# Patient Record
Sex: FEMALE | Employment: FULL TIME | URBAN - METROPOLITAN AREA
[De-identification: names, ages, dates, MRNs, and addresses within clinical notes are randomized per-mention and may not be internally consistent; named-entity substitution may affect disease eponyms.]

---
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
## 2020-03-25 DIAGNOSIS — J45.30 MILD PERSISTENT ASTHMA WITHOUT COMPLICATION: ICD-10-CM

## 2020-03-25 RX ORDER — ALBUTEROL SULFATE 0.83 MG/ML
SOLUTION RESPIRATORY (INHALATION)
Qty: 75 ML | Refills: 0 | Status: SHIPPED | OUTPATIENT
Start: 2020-03-25 | End: 2020-11-07

## 2020-03-25 RX ORDER — ALBUTEROL SULFATE 90 UG/1
AEROSOL, METERED RESPIRATORY (INHALATION)
Qty: 18 G | Refills: 0 | Status: SHIPPED | OUTPATIENT
Start: 2020-03-25 | End: 2021-04-12

## 2020-03-25 RX ORDER — BUDESONIDE AND FORMOTEROL FUMARATE DIHYDRATE 80; 4.5 UG/1; UG/1
AEROSOL RESPIRATORY (INHALATION)
Qty: 11 G | Refills: 0 | Status: SHIPPED | OUTPATIENT
Start: 2020-03-25

## 2020-03-25 NOTE — TELEPHONE ENCOUNTER
"Requested Prescriptions   Pending Prescriptions Disp Refills     albuterol (PROAIR HFA/PROVENTIL HFA/VENTOLIN HFA) 108 (90 Base) MCG/ACT inhaler [Pharmacy Med Name: Albuterol Sulfate  (90 Base) MCG/ACT Inhalation Aerosol Solution]  Last Written Prescription Date:  02/07/2019  Last Fill Quantity: 3 inhaler,  # refills: 3   Last office visit: 11/20/2019 with prescribing provider:  Dr. Zuniga   Future Office Visit:     18 g 0     Sig: INHALE 2 PUFFS BY MOUTH EVERY 6 HOURS AS NEEDED FOR SHORTNESS OF BREATH /DYSPNEA  OR  WHEEZING       Asthma Maintenance Inhalers - Anticholinergics Failed - 3/25/2020  9:36 AM        Failed - Asthma control assessment score within normal limits in last 6 months     Please review ACT score.   ACT Total Scores 2/7/2019 7/3/2019 9/25/2019   ACT TOTAL SCORE (Goal Greater than or Equal to 20) 20 19 21   In the past 12 months, how many times did you visit the emergency room for your asthma without being admitted to the hospital? 1 1 0   In the past 12 months, how many times were you hospitalized overnight because of your asthma? 0 1 0             Passed - Patient is age 12 years or older        Passed - Medication is active on med list        Passed - Recent (6 mo) or future (30 days) visit within the authorizing provider's specialty     Patient had office visit in the last 6 months or has a visit in the next 30 days with authorizing provider or within the authorizing provider's specialty.  See \"Patient Info\" tab in inbasket, or \"Choose Columns\" in Meds & Orders section of the refill encounter.           Short-Acting Beta Agonist Inhalers Protocol  Failed - 3/25/2020  9:36 AM        Failed - Asthma control assessment score within normal limits in last 6 months     Please review ACT score.           Passed - Patient is age 12 or older        Passed - Medication is active on med list        Passed - Recent (6 mo) or future (30 days) visit within the authorizing provider's specialty     " "Patient had office visit in the last 6 months or has a visit in the next 30 days with authorizing provider or within the authorizing provider's specialty.  See \"Patient Info\" tab in inbasket, or \"Choose Columns\" in Meds & Orders section of the refill encounter.               albuterol (PROVENTIL) (2.5 MG/3ML) 0.083% neb solution [Pharmacy Med Name: Albuterol Sulfate (2.5 MG/3ML) 0.083% Inhalation Nebulization Solution]  Last Written Prescription Date:  02/07/2019  Last Fill Quantity: 360ml,  # refills: 3   Last office visit: 11/20/2019 with prescribing provider:  Dr. Zuniga   Future Office Visit:     75 mL 0     Sig: USE 1 VIAL IN NEBULIZER EVERY 4 HOURS AS NEEDED FOR SHORTNESS OF BREATH /DYSPNEA  OR  WHEEZING       Asthma Maintenance Inhalers - Anticholinergics Failed - 3/25/2020  9:36 AM        Failed - Asthma control assessment score within normal limits in last 6 months     Please review ACT score.           Passed - Patient is age 12 years or older        Passed - Medication is active on med list        Passed - Recent (6 mo) or future (30 days) visit within the authorizing provider's specialty     Patient had office visit in the last 6 months or has a visit in the next 30 days with authorizing provider or within the authorizing provider's specialty.  See \"Patient Info\" tab in inbasket, or \"Choose Columns\" in Meds & Orders section of the refill encounter.           Short-Acting Beta Agonist Inhalers Protocol  Failed - 3/25/2020  9:36 AM        Failed - Asthma control assessment score within normal limits in last 6 months     Please review ACT score.   ACT Total Scores 2/7/2019 7/3/2019 9/25/2019   ACT TOTAL SCORE (Goal Greater than or Equal to 20) 20 19 21   In the past 12 months, how many times did you visit the emergency room for your asthma without being admitted to the hospital? 1 1 0   In the past 12 months, how many times were you hospitalized overnight because of your asthma? 0 1 0             Passed - " "Patient is age 12 or older        Passed - Medication is active on med list        Passed - Recent (6 mo) or future (30 days) visit within the authorizing provider's specialty     Patient had office visit in the last 6 months or has a visit in the next 30 days with authorizing provider or within the authorizing provider's specialty.  See \"Patient Info\" tab in inbasket, or \"Choose Columns\" in Meds & Orders section of the refill encounter.               budesonide-formoterol (SYMBICORT) 80-4.5 MCG/ACT Inhaler [Pharmacy Med Name: BUDESON/FORMOT 80-4.5 MCG AER]  Last Written Prescription Date:  02/07/2019  Last Fill Quantity: 1 inhaler,  # refills: 3   Last office visit: 11/20/2019 with prescribing provider:  Dr. Zuniga   Future Office Visit:     11 g 0     Sig: Inhale 2 puffs by mouth twice daily       Inhaled Steroids Protocol Failed - 3/25/2020  9:36 AM        Failed - Asthma control assessment score within normal limits in last 6 months     Please review ACT score.   ACT Total Scores 2/7/2019 7/3/2019 9/25/2019   ACT TOTAL SCORE (Goal Greater than or Equal to 20) 20 19 21   In the past 12 months, how many times did you visit the emergency room for your asthma without being admitted to the hospital? 1 1 0   In the past 12 months, how many times were you hospitalized overnight because of your asthma? 0 1 0             Passed - Patient is age 12 or older        Passed - Medication is active on med list        Passed - Recent (6 mo) or future (30 days) visit within the authorizing provider's specialty     Patient had office visit in the last 6 months or has a visit in the next 30 days with authorizing provider or within the authorizing provider's specialty.  See \"Patient Info\" tab in inbasket, or \"Choose Columns\" in Meds & Orders section of the refill encounter.           Long-Acting Beta Agonist Inhalers Protocol  Failed - 3/25/2020  9:36 AM        Failed - Asthma control assessment score within normal limits in last 6 " "months     Please review ACT score.           Failed - Order for Serevent, Striverdi, or Foradil and pt has steroid inhaler        Passed - Patient is age 12 or older        Passed - Medication is active on med list        Passed - Recent (6 mo) or future (30 days) visit within the authorizing provider's specialty     Patient had office visit in the last 6 months or has a visit in the next 30 days with authorizing provider or within the authorizing provider's specialty.  See \"Patient Info\" tab in inbasket, or \"Choose Columns\" in Meds & Orders section of the refill encounter.                 "

## 2020-06-16 ENCOUNTER — NURSE TRIAGE (OUTPATIENT)
Dept: NURSING | Facility: CLINIC | Age: 20
End: 2020-06-16

## 2020-06-16 NOTE — TELEPHONE ENCOUNTER
"Mother is calling with questions regarding testing for her special needs daughter. Patient only has some complaints of being tired and wanting to rest a lot. She has a history of headaches and dizziness. She also has other chronic conditions. Mother doesn't think that the patient was exposed to covid-19 but was enquiring about testing just to be sure.   Mother was told that [facility] is currently testing symptomatic patients and patients coming in for procedures, etc. Mother was given information about looking for testing sites on the [place] Department of Health web site for sites that are doing testing without a doctor's order. What possible symptoms to look for was also discussed. Mother will look at the web site and call back if the situation changes.      Additional Information    Health Information question, no triage required and triager able to answer question    Answer Assessment - Initial Assessment Questions  1. REASON FOR CALL or QUESTION: \"What is your reason for calling today?\" or \"How can I best help you?\" or \"What question do you have that I can help answer?\"      Can my daughter have covid testing if she is not having symptoms?    Protocols used: INFORMATION ONLY CALL-CASTRO-AUREA Sanchez RN on 6/16/2020 at 12:12 PM    "

## 2020-08-07 ENCOUNTER — NURSE TRIAGE (OUTPATIENT)
Dept: NURSING | Facility: CLINIC | Age: 20
End: 2020-08-07

## 2020-08-07 NOTE — TELEPHONE ENCOUNTER
Caller was the mom (patient was around and gave a telephone consent to talk to mom)  Caller state that patient was bitten by an insect  (not sure the type of insect/bug) yesterday  Patient took the phone from mom, states that the site is painful and swollen.  States the redness is spreading-denied fever or difficulty breathing.States that the rash is becoming purplish in color  Patient states that she has asthma-might not be easy for him to detect respiratory issue.  Advised to be seen in person within 24 hours in person-was declined.  Waleska Cerna RN.  COVID 19 Nurse Triage Plan/Patient Instructions    Please be aware that novel coronavirus (COVID-19) may be circulating in the community. If you develop symptoms such as fever, cough, or SOB or if you have concerns about the presence of another infection including coronavirus (COVID-19), please contact your health care provider or visit [email or web address].     Disposition/Instructions    In-Person Visit with provider recommended. Reference Visit Selection Guide.    Thank you for taking steps to prevent the spread of this virus.  o Limit your contact with others.  o Wear a simple mask to cover your cough.  o Wash your hands well and often.    Resources    [facility]: About COVID-19: [email or web address]    CDC: What to Do If You're Sick: www.cdc.gov/coronavirus/2019-ncov/about/steps-when-sick.html    CDC: Ending Home Isolation: www.cdc.gov/coronavirus/2019-ncov/hcp/disposition-in-home-patients.html     CDC: Caring for Someone: www.cdc.gov/coronavirus/2019-ncov/if-you-are-sick/care-for-someone.html     [facility]: Interim Guidance for Hospital Discharge to Home: www.health.[facility].[place].us/diseases/coronavirus/hcp/hospdischarge.pdf    [facility] clinical trials (COVID-19 research studies): [email or web address].[facility]/umn-clinical-trials     Below are the COVID-19 hotlines at the [place] Department of Health ([facility]). Interpreters are available.   o For  health questions: Call 877-157-6845 or 1-876.900.8551 (7 a.m. to 7 p.m.)  o For questions about schools and childcare: Call 052-373-3977 or 1-319.427.7976 (7 a.m. to 7 p.m.)                       Additional Information    Negative: Unresponsive, passed out or very weak    Negative: Difficulty breathing or wheezing    Negative: [1] Hoarseness or cough AND [2] sudden onset following bite    Negative: [1] Difficulty swallowing, drooling or slurred speech AND [2] sudden onset following bite    Negative: Confused thinking or talking    Negative: [1] Life-threatening reaction (anaphylaxis) in the past to same insect bite AND [2] < 2 hours since bite    Negative: Sounds like a life-threatening emergency to the triager    Negative: Mosquito bite    Negative: Bee sting    Negative: Bed bug bite(s)    Negative: Fire ant sting    Negative: Spider bite    Negative: Tick bite    Negative: Doesn't sound like an insect bite    Negative: [1] Caterpillar exposure AND [2] eye symptoms    Negative: [1] Fever AND [2] spreading red area or streak    Negative: Child sounds very sick or weak to the triager    Negative: [1] Caterpillar sting AND [2] systemic symptoms (widespread hives, vomiting, muscle pain, etc)  AND [3] no 911 symptoms    [1] Painful spreading redness AND [2] started over 24 hours after the bite AND [3] no fever    Protocols used: INSECT BITE-P-

## 2020-08-29 NOTE — PROGRESS NOTES
"CC:  Cyndi Grady is a 18 year old female here for follow-up of multiple concerns as below, most notably POTS, chronic daily headaches, asthma, and documentation required for community college in the fall.    HPI:  We discussed the following concerns:    1. POTS.  This is Cyndi's most bothersome medical concern.  She continues to have fatigue, dizziness, and lightheadedness that keep her in bed and from attending some activities.  She has been able to work as a  this summer and POTS has not interfered too much with her job.  However, in the spring, she was unable to receive her diploma because of the number of days she missed in school; she did receive her GED equivalent and will be starting community college in the fall at [employer].  Her mother Imani states that joint pain sometimes prevents Cyndi from getting out of bed; Cyndi described this more optimistically and said she feels that her pain is under good control.  She is going to physical therapy once a week with good results, and Cyndi reports that this has helped her feel she is able to stand much longer at work.  She is not using a wheelchair or any assistive device for mobility.  She is drinking approximately 4 glasses of water per day.  She has three meals a day and occasional snacks.    2.  Mood. Cyndi's mood has been \"great.\"  She attributes this to a fun summer and lots of sunlight.  She is followed by psychiatry and is on fluoxetine 30mg daily.  She had thoughts of self-harm a couple years ago that she never acted upon, and has not had any of these feelings within the past year.  She was going to an art therapist on a regular basis (once a week) for a period of time, but last saw her therapist three months ago.  She is open to going back to therapy before community college starts.    3.  Chronic daily headache, GI upset, nausea. These have been persistent and occurring about every other day, especially if sleep is poor (see below).  " Patient mother updated with patient labs, vs, pain level, and that we got her up to chair and that Cherri Habermann saw her "However, they are not interfering with activities as much as they have been in the past.  Nausea seems to have improved as it used to occur daily.  yCndi notes that in the past she had used an elimination diet with some improvement in her headaches, GI upset, and nausea.  She tried it \"full court press\" for two weeks in 2017 with substantial improvement, especially in GI upset, but \"life got in the way\" and she was unable to maintain the elimination diet.  She tried again in early 2018 and was able to maintain it about five days.  Today, she expressed interest in trying it again in anticipation of starting community college in the fall.    4. Sleep. Sleep is \"okay.\"  She goes to bed at 11pm-12am and falls asleep within an hour.  She reports this is better than past visits, when it used to take her two hours to fall asleep.  She does have occasional nighttime awakenings and has trouble falling back asleep.  She has never tried melatonin or other medication for sleep.    5.  Heavy menstrual periods. We briefly discussed Cyndi's heavy periods, for which she takes Zahira and receives depo (estradiol only) from her gynecologist.  She reports missing about five pills per months, which leads to some irregular spotting; however otherwise her symptoms are manageable and she is content with her current regimen.    6. Acne. On Retin-A 0.025% cream which was prescribed at last visit; she is happy with these results.    7.  Asthma.  ACT of 20 today indicating adequate control.  Using albuterol nebulizer approximately once per month.  No nighttime awakenings.  Cyndi reports good adherence to her daily controler.  Last ACT on 3/20/18 was 10; this was shortly after a hospitalization for influenza and asthma exacerbation.        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     " Environmental allergies     Peanut allergy     Chronic daily headache     Chronic nausea     Egg allergy     Herpes simplex vulvovaginitis     Acne vulgaris     Current Outpatient Prescriptions   Medication     albuterol (PROAIR HFA/PROVENTIL HFA/VENTOLIN HFA) 108 (90 BASE) MCG/ACT Inhaler     budesonide-formoterol (SYMBICORT) 80-4.5 MCG/ACT Inhaler     drospirenone-ethinyl estradiol (JAYCOB) 3-0.02 MG per tablet     estradiol cypionate (DEPO-ESTRADIOL) 5 MG/ML injection     FLUoxetine HCl (PROZAC PO)     loratadine-pseudoePHEDrine (CLARITIN-D 24-HOUR)  MG per 24 hr tablet     tretinoin (RETIN-A) 0.025 % cream     albuterol (2.5 MG/3ML) 0.083% neb solution     EPINEPHrine (ADRENACLICK) 0.3 MG/0.3ML injection 2-pack     EPINEPHrine (EPIPEN/ADRENACLICK/OR ANY BX GENERIC EQUIV) 0.3 MG/0.3ML injection 2-pack     mometasone-formoterol (DULERA) 100-5 MCG/ACT oral inhaler     triamcinolone (KENALOG) 0.1 % ointment     valACYclovir (VALTREX) 500 MG tablet     No current facility-administered medications for this visit.         Allergies   Allergen Reactions     Cats      Dogs      Egg [Chicken-Derived Products (Egg)]      Milk Protein Extract      Abdominal pain.     Nuts [Peanut-Derived] Hives and Swelling     Tree nuts-Cashew, pecans,walnuts.     Peanuts [Nuts] Hives and Swelling     Sesame Oil Hives and Swelling     Sesame seeds-not oil.     Most Recent Immunizations   Administered Date(s) Administered     DTAP (<7y) 04/13/2004     HEPA 05/04/2009     HPV 06/21/2016     HPV9 07/25/2017     HepB 01/24/2001     Influenza (IIV3) PF 10/23/2012     Influenza Vaccine IM 3yrs+ 4 Valent IIV4 12/16/2016     MMR 04/13/2004     Meningococcal (Bexsero ) 03/20/2018     Meningococcal (Menactra ) 03/20/2018     Pedvax-hib 06/11/2001     Pneumococcal (PCV 7) 06/11/2001     Poliovirus, inactivated (IPV) 04/13/2004     TD (ADULT, 7+) 09/19/2003     Tdap (Adacel,Boostrix) 08/22/2011     Varicella 08/11/2008     Review Of Systems:  "complete review of systems was performed and is negative except as above in HPI for headaches, joint pain, nausea, dizziness, fatigue.    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  She will be starting [employer] in the fall.      P.E.:  /81  Pulse 74  Temp 97.1  F (36.2  C) (Oral)  Ht 5' 9.41\" (1.763 m)  Wt 135 lb 6 oz (61.4 kg)  LMP 06/25/2018 (Approximate)  BMI 19.76 kg/m2  Gen: alert, in NAD  HEENT: Normocephalic, atraumatic, moist mucus membranes  Neck: supple. no nodes, no thyromegaly  Cor: RRR, normal S1, S2, no murmurs, rubs or gallops  Resp: CTA bilaterlly, no wheezes, rales or ronchi  Abdom: good BS, soft NT, ND, no organomegaly  Extrems: no clubbing, cyanosis or edema  Skin: mild comedonal acne to forehead, otherwise no lesions, skin well-perfused    Assessment: Cyndi has had significant improvement in mood and symptoms since her last visit; however she still has persistent symptoms from POTS, chronic daily headaches, GI upset, nausea, and environmental and food allergies.  She is willing to get back into art therapy, which we support.  She was interested in the idea of another trial of the elimination diet today, and we suggested a return visit to discuss this in more detail if she decides she would like to do this.    Plan:  1.  Consider trial of elimination diet for chronic daily headaches, GI upset, POTS -family will schedule clinic visit to discuss if they want to move forward with this.  2.  Recommended increasing water intake to minimum 8 glasses of water spread throughout the day.  Continue three daily meals and frequent snacks.  3.  Letter for accommodations provided for [employer] to allow access to water, snacks, extended time for tests, and private room for tests.  4.  Immunizations up to date.  5.  Continue other medications (fluoxetine, Zahira) and routine follow-up with therapist, gyn, and pxychiatry.    Sahra" MD Sandor, Adolescent Medicine Fellow    Attending:  Patient seen, examined and discussed with fellow.  Agree with above assessment and plan.  Spent over 50% of the visit in face-to face counseling regarding issues documented above in note by fellow.  Total visit time: 40 minutes.

## 2020-09-03 ENCOUNTER — TELEPHONE (OUTPATIENT)
Dept: PEDIATRICS | Facility: CLINIC | Age: 20
End: 2020-09-03

## 2020-09-03 NOTE — TELEPHONE ENCOUNTER
I called mother and let her know that given Cyndi's age, it would ideal for her to establish care with an adult provider now.  I recommended the [facility] on [facility].

## 2020-09-03 NOTE — TELEPHONE ENCOUNTER
Reason for Call:  Other     Detailed comments: Mother would like to speak with provider regarding patients current health. Would not elaborate.    Phone Number   COLUMBA BREWER (Mother) [phone] (H)         Best Time:     Can we leave a detailed message on this number? YES    Call taken on 9/3/2020 at 10:12 AM by Kelsy García

## 2020-09-22 ENCOUNTER — OFFICE VISIT (OUTPATIENT)
Dept: PEDIATRICS | Facility: CLINIC | Age: 20
End: 2020-09-22
Payer: COMMERCIAL

## 2020-09-22 VITALS
HEIGHT: 70 IN | WEIGHT: 136.8 LBS | BODY MASS INDEX: 19.58 KG/M2 | HEART RATE: 63 BPM | TEMPERATURE: 98.3 F | DIASTOLIC BLOOD PRESSURE: 76 MMHG | SYSTOLIC BLOOD PRESSURE: 113 MMHG

## 2020-09-22 DIAGNOSIS — M79.7 FIBROMYALGIA: ICD-10-CM

## 2020-09-22 DIAGNOSIS — Z23 ENCOUNTER FOR IMMUNIZATION: ICD-10-CM

## 2020-09-22 DIAGNOSIS — R13.10 DYSPHAGIA, UNSPECIFIED TYPE: Primary | ICD-10-CM

## 2020-09-22 PROCEDURE — 90620 MENB-4C VACCINE IM: CPT | Performed by: PEDIATRICS

## 2020-09-22 PROCEDURE — 99215 OFFICE O/P EST HI 40 MIN: CPT | Mod: 25 | Performed by: PEDIATRICS

## 2020-09-22 PROCEDURE — 90471 IMMUNIZATION ADMIN: CPT | Performed by: PEDIATRICS

## 2020-09-22 PROCEDURE — 90472 IMMUNIZATION ADMIN EACH ADD: CPT | Performed by: PEDIATRICS

## 2020-09-22 PROCEDURE — 90686 IIV4 VACC NO PRSV 0.5 ML IM: CPT | Performed by: PEDIATRICS

## 2020-09-22 ASSESSMENT — MIFFLIN-ST. JEOR: SCORE: 1463.9

## 2020-09-22 ASSESSMENT — PATIENT HEALTH QUESTIONNAIRE - PHQ9: SUM OF ALL RESPONSES TO PHQ QUESTIONS 1-9: 7

## 2020-09-22 NOTE — PROGRESS NOTES
"Subjective    Cyndi Anders Grady is a 20 year old female who presents to clinic today with mother because of:  RECHECK; Health Maintenance (HPV/ACT/PHQ/Chlamydia); and Flu Shot     HPI   Cyndi is a 20 year old female with past medical history including postural orthostatic tachycardia syndrome, chronic headache, chronic nausea, moderate asthma, environmental allergy, positive IgE testing to foods without anaphylaxis history.    Concerns: Will like handicap renewal, and vaccinations     Requests handicap parking paperwork due to her postural orthostatic tachycardia syndrome making it difficult for her to walk long distances.  She notes that she is had orthostasis roughly 3 times per week but not had non-orthostatic vasovagal presyncope.  She also notes that she has been having migrating soreness and tingling over her legs.  It does not seem to be related to physical activity or trauma.  She has not tried medications to help with pain.  She does note that it has gotten worse over the course of the past several months, and notes she thinks it could be related to the fact that she has not been doing physical therapy since the beginning of coronavirus quarantine.    At her last visit, Dr. Zuniga recommended omission of eggs and dairy products from her diet.  She has continued to consume dairy and eggs since her last appointment.  She would like to cut down on these foods she says.     She notes that she is also been having quite a bit of nausea and early satiety as well as bloating and abdominal distention.  She does not have gas.  She has not had any emesis and she has 2-3 soft stools per day.  She endorses some difficulty swallowing over the last couple of months.  She describes it as \"hard to push the food down\" and \"hard to close my airway\".  She says she feels that the \"food is going down to the right\" instead of straight down to her stomach.  She has had no regurgitation or emesis.  She does endorse some reflux " "symptoms.  She is also been having some chest tightness about 3 times a day that sometimes seems to be associated with eating.  She does have history of eczema, which seems to be well-controlled at this time, as well as positive IgE to several foods without anaphylaxis.     She does feel comfortable with her body image, however recently lost some weight due to missing meals.  She attributes this to her \"messed up\" sleep schedule where she was going to bed around 3 or 4 AM and waking up around 1 or 2 PM.  She has gained some weight back to her baseline.    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 10/06/2016     Priority: Medium     Environmental allergies 10/06/2016     Priority: Medium     Reactive depression 06/27/2016     Priority: Medium     Moderate asthma 06/21/2016     Priority: Medium      Medications  albuterol (PROAIR HFA/PROVENTIL HFA/VENTOLIN HFA) 108 (90 Base) MCG/ACT inhaler, INHALE 2 PUFFS BY MOUTH EVERY 6 HOURS AS NEEDED FOR SHORTNESS OF BREATH /DYSPNEA  OR  WHEEZING  albuterol (PROVENTIL) (2.5 MG/3ML) 0.083% neb solution, USE 1 VIAL IN NEBULIZER EVERY 4 HOURS AS NEEDED FOR SHORTNESS OF BREATH /DYSPNEA  OR  WHEEZING  budesonide-formoterol (SYMBICORT) 80-4.5 MCG/ACT Inhaler, Inhale 2 puffs by mouth twice daily  loratadine-pseudoePHEDrine (CLARITIN-D 24-HOUR)  MG 24 hr tablet, Take 1 tablet by mouth daily Reported on 3/16/2017  mometasone (NASONEX) 50 MCG/ACT nasal " "spray, Spray 2 sprays into both nostrils daily  triamcinolone (KENALOG) 0.1 % external ointment, Apply sparingly to affected area three times daily for 14 days.  Not to be used on the face.  EPINEPHrine (ADRENACLICK) 0.3 MG/0.3ML injection 2-pack, Inject 0.3 mLs (0.3 mg) into the muscle as needed for anaphylaxis  EPINEPHrine (EPIPEN/ADRENACLICK/OR ANY BX GENERIC EQUIV) 0.3 MG/0.3ML injection 2-pack, Inject 0.3 mLs (0.3 mg) into the muscle as needed for anaphylaxis  tretinoin (RETIN-A) 0.025 % cream, Spread a pea size amount into affected area topically at bedtime.  Use sunscreen SPF>20. (Patient not taking: Reported on 9/22/2020)    No current facility-administered medications on file prior to visit.     Allergies  Allergies   Allergen Reactions     Cats      Dogs      Egg [Chicken-Derived Products (Egg)]      Milk Protein Extract      Abdominal pain.     Nuts [Peanut-Derived] Hives and Swelling     Tree nuts-Cashew, pecans,walnuts.     Peanuts [Nuts] Hives and Swelling     Sesame Oil Hives and Swelling     Sesame seeds-not oil.     Reviewed and updated as needed this visit by Provider           Objective    /76   Pulse 63   Temp 98.3  F (36.8  C) (Oral)   Ht 5' 9.57\" (1.767 m)   Wt 136 lb 12.8 oz (62.1 kg)   BMI 19.87 kg/m    Facility age limit for growth percentiles is 20 years.    Physical Exam  GENERAL: Active, alert, in no acute distress.  SKIN: Clear. No significant rash, abnormal pigmentation or lesions  HEAD: Normocephalic.  EYES:  No discharge or erythema. Normal pupils and EOM.  MOUTH/THROAT: Clear. No oral lesions. Teeth intact without obvious abnormalities.  ABDOMEN: Soft, non-tender, not distended, no masses or hepatosplenomegaly.         Assessment & Plan        Cyndi is a 20-year-old female with significant past medical history including postural orthostatic tachycardia syndrome, moderate asthma, food allergies, and history of reactive depression.  She presents today for renewal of her " handicap parking and flu shot.    Recent development of difficulty swallowing and globus is concerning.   Differential diagnosis of difficulty swallowing and/or globus is very wide, given her history of atopy, as well as history of abdominal pain with new onset of swallowing difficulty, eosinophilic esophagitis is worth considering.  She may benefit from further evaluation by adult ENT and/or gastroenterology.    Additionally, her leg pain is concerning for new onset of fibromyalgia, particularly since it is aggravated by lack of physical therapy and disruption in normal life.  She does have history of mental health concerns in addition.  She may benefit from consideration of therapy as well as continuation of physical therapy.     Dr Zuniga recommended last year that Cyndi transition to adult care rather than pediatric.  Given these new concerns and likely need for further subspecialty care, it would be more appropriate to transition to a new primary care provider prior to making these referrals.    Plan  -Recommend establishing as soon as possible with an adult primary care physician.  -Recommend restarting physical therapy as soon as possible.  -Discussed sleep hygiene and goals including no electronic screens 30-60 minutes before bed, consistency of bedtime, bedtime routine, helpfulness of exercise.  Patient set goals to move bedtime back 30 minutes each day incrementally until she is going to bed at midnight.  -Administered flu and men B vaccines today  -Renew handicap parking    Other orders  -     VACCINE ADMINISTRATION, INITIAL  -     Cancel: VACCINE ADMINISTRATION, EACH ADDITIONAL  -     HC FLU VAC PRESRV FREE QUAD SPLIT VIR > 6 MONTHS IM  -     MEN B, IM (10 - 25 YRS) - Bexsero  -     VACCINE ADMINISTRATION, EACH ADDITIONAL        Follow Up  No follow-ups on file.    Patient was seen and discussed with Jose L Esqueda MD, [address]  [facility]    Attending:  Patient seen, examined and  discussed with resident.  Agree with above assessment and plan.  Spent over 50% of the visit in face-to face counseling regarding issues documented above in note by resident.  Total visit time: 40 minutes.    Gera Zuniga MD

## 2020-09-23 ASSESSMENT — ASTHMA QUESTIONNAIRES: ACT_TOTALSCORE: 20

## 2020-10-07 ENCOUNTER — TELEPHONE (OUTPATIENT)
Dept: PEDIATRICS | Facility: CLINIC | Age: 20
End: 2020-10-07

## 2020-10-07 NOTE — TELEPHONE ENCOUNTER
I called mother to discuss details of letter.  Letter has been written and electronically signed, and is now ready to be printed and left at the  for mother to .

## 2020-10-07 NOTE — TELEPHONE ENCOUNTER
I called mother back on the phone listed for home, and got a voicemail box.  LMOV asking mother to call the clinic back and let us know when would be good times to call.

## 2020-10-07 NOTE — LETTER
[place] [address] [facility] [address]  Phone: [phone]    October 7, 2020      Re: Cyndi Wick Grday  [address]  [facility] [address]      To whom it may concern:       Cyndi is a patient of mine whom I have been caring for since 2016. [facility] [place], [place] in 2013.  In August of this year, however, I saw her in clinic for new symptoms, including fatigue and pain, as well as difficulty swallowing, prompting me to refer to adult specialists for further diagnostic work-up for these new symptoms (which she is in the process of receiving).    These new medical problems have prevented her from fully participating in her academic work this fall, and so I am requesting that her academic institution allow her accommodations, including being allowed to drop any classes she has not been able to participate fully in, so that she may be eligible to complete this coursework at a future date when her health issues have resolved.    Thank you for your consideration in this matter. Please do not hesitate to contact me with any further questions or concerns.       Sincerely,        Gera Zuniga MD

## 2020-11-07 DIAGNOSIS — J45.30 MILD PERSISTENT ASTHMA WITHOUT COMPLICATION: ICD-10-CM

## 2020-11-07 RX ORDER — ALBUTEROL SULFATE 90 UG/1
2 AEROSOL, METERED RESPIRATORY (INHALATION) EVERY 6 HOURS
Qty: 18 G | Refills: 1 | Status: SHIPPED | OUTPATIENT
Start: 2020-11-07

## 2020-11-07 NOTE — TELEPHONE ENCOUNTER
"Requested Prescriptions   Signed Prescriptions Disp Refills    albuterol (PROAIR HFA/PROVENTIL HFA/VENTOLIN HFA) 108 (90 Base) MCG/ACT inhaler 18 g 1     Sig: Inhale 2 puffs into the lungs every 6 hours       Asthma Maintenance Inhalers - Anticholinergics Passed - 11/7/2020  8:39 AM        Passed - Patient is age 12 years or older        Passed - Asthma control assessment score within normal limits in last 6 months     Please review ACT score.           Passed - Medication is active on med list        Passed - Recent (6 mo) or future (30 days) visit within the authorizing provider's specialty     Patient had office visit in the last 6 months or has a visit in the next 30 days with authorizing provider or within the authorizing provider's specialty.  See \"Patient Info\" tab in inbasket, or \"Choose Columns\" in Meds & Orders section of the refill encounter.           Short-Acting Beta Agonist Inhalers Protocol  Passed - 11/7/2020  8:39 AM        Passed - Patient is age 12 or older        Passed - Asthma control assessment score within normal limits in last 6 months     Please review ACT score.           Passed - Medication is active on med list        Passed - Recent (6 mo) or future (30 days) visit within the authorizing provider's specialty     Patient had office visit in the last 6 months or has a visit in the next 30 days with authorizing provider or within the authorizing provider's specialty.  See \"Patient Info\" tab in inbasket, or \"Choose Columns\" in Meds & Orders section of the refill encounter.               Refilled per protocol, inhaler sent due to RICH.    Dora Zavala RN    "

## 2020-11-12 ENCOUNTER — OFFICE VISIT (OUTPATIENT)
Dept: DERMATOLOGY | Facility: CLINIC | Age: 20
End: 2020-11-12
Payer: COMMERCIAL

## 2020-11-12 DIAGNOSIS — L70.0 ACNE VULGARIS: Primary | ICD-10-CM

## 2020-11-12 PROCEDURE — 99203 OFFICE O/P NEW LOW 30 MIN: CPT | Mod: GC | Performed by: DERMATOLOGY

## 2020-11-12 RX ORDER — TRETINOIN 0.5 MG/G
CREAM TOPICAL AT BEDTIME
Qty: 45 G | Refills: 2 | Status: SHIPPED | OUTPATIENT
Start: 2020-11-12 | End: 2020-11-13

## 2020-11-12 RX ORDER — CLINDAMYCIN PHOSPHATE 10 UG/ML
LOTION TOPICAL DAILY
Qty: 60 ML | Refills: 2 | Status: SHIPPED | OUTPATIENT
Start: 2020-11-12 | End: 2020-11-13

## 2020-11-12 ASSESSMENT — PAIN SCALES - GENERAL: PAINLEVEL: MILD PAIN (2)

## 2020-11-12 NOTE — NURSING NOTE
Dermatology Rooming Note    Cyndi Mcnamara Shamikaadam Grady's goals for this visit include:   Chief Complaint   Patient presents with     Derm Problem     Cnydi is here for concerns of acne on her face, arms, chest, and back.         Deedee Rios LPN

## 2020-11-12 NOTE — PATIENT INSTRUCTIONS
- start using benzoyl peroxide 5% wash daily (okay to use on face, chest, back, arms)  - start using clindamycin 1% lotion daily (okay to use on face, chest, back, arms)  - start tretinoin 0.05% cream nightly to face only

## 2020-11-12 NOTE — LETTER
"11/12/2020       RE: Cyndi Grady  [address]  [place] [address]     Dear Colleague,    Thank you for referring your patient, Cyndi Grady, to the [facility] [place] at [facility]. Please see a copy of my visit note below.    [facility] Dermatology Note      Dermatology Problem List:  # Pertinent PMH: POTS.  1. Acne vulgaris, inflammatory +/- isotretinoin.  - tretinoin 0.05% cream nightly  - clindamcyin lotion  - BPO 5% wash daily in shower  - future: consider isotretinoin, likely avoid spironolactone due to h/o POTS    Encounter Date: Nov 12, 2020    CC:   Chief Complaint   Patient presents with     Derm Problem     Cyndi is here for concerns of acne on her face, arms, chest, and back.           History of Present Illness:  Ms. Cyndi Grady is a 20 year old female who presents in self referral for acne vulgaris. Patient states that she has had acne ever since she was 12. She says that she previously saw a Dermatologist for this and was prescribed a \"wash and a cream\" that she used faithfully and it completely resolved her acne. She stopped using it because her acne was so well controlled. She can't remember exactly how long ago she stopped using it. She reports that over the last year her acne has become a big problem for her again. She denies any association with her menstrual cycle, but admits that her menses are very irregular. She uses Nexplanon for birth control and thinks her acne may have gotten slightly worse with this. She says her acne affects her entire face, but also her upper chest, upper arms, and has in the past affected her entire back. She mentions that she uses at least two different OTC facial washes, uses a niacinamide serum, and moisturizers for her face.     She states that she has postural orthostatic tachycardia syndrome and frequently feels lightheaded due to " hypotension. She is not on medications for this, but has been instructed to increase her fluid and salt intake.   She has no other health concerns or skin concerns today and says that she is feeling well.      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 She has never used smokeless tobacco. She reports current drug use. Drug: Marijuana. She reports that she does not drink alcohol.    Family History:  Family History   Problem Relation Age of Onset     Melanoma No family hx of      Skin Cancer No family hx of        Medications:  Current Outpatient Medications   Medication Sig Dispense Refill     albuterol (PROAIR HFA/PROVENTIL HFA/VENTOLIN HFA) 108 (90 Base) MCG/ACT inhaler Inhale 2 puffs into the lungs every 6 hours 18 g 1     albuterol (PROAIR HFA/PROVENTIL HFA/VENTOLIN HFA) 108 (90 Base) MCG/ACT inhaler INHALE 2 PUFFS BY MOUTH EVERY 6 HOURS AS NEEDED FOR SHORTNESS OF BREATH /DYSPNEA  OR  WHEEZING 18 g 0     budesonide-formoterol (SYMBICORT) 80-4.5 MCG/ACT Inhaler Inhale 2 puffs by mouth twice daily 11 g 0     EPINEPHrine (ADRENACLICK) 0.3 MG/0.3ML injection 2-pack Inject 0.3 mLs (0.3 mg) into the muscle as needed for anaphylaxis 0.6 mL 1     EPINEPHrine (EPIPEN/ADRENACLICK/OR ANY BX GENERIC EQUIV) 0.3 MG/0.3ML injection 2-pack Inject 0.3 mLs (0.3 mg) into the muscle as needed for anaphylaxis 0.6 mL 1     loratadine-pseudoePHEDrine (CLARITIN-D 24-HOUR)  MG 24 hr tablet Take 1  tablet by mouth daily Reported on 3/16/2017 14 tablet 11     mometasone (NASONEX) 50 MCG/ACT nasal spray Spray 2 sprays into both nostrils daily 17 g 3     triamcinolone (KENALOG) 0.1 % external ointment Apply sparingly to affected area three times daily for 14 days.  Not to be used on the face. 15 g 3     tretinoin (RETIN-A) 0.025 % cream Spread a pea size amount into affected area topically at bedtime.  Use sunscreen SPF>20. (Patient not taking: Reported on 9/22/2020) 45 g 11        Allergies   Allergen Reactions     Cats      Dogs      Egg [Chicken-Derived Products (Egg)]      Milk Protein Extract      Abdominal pain.     Nuts [Peanut-Derived] Hives and Swelling     Tree nuts-Cashew, pecans,walnuts.     Peanuts [Nuts] Hives and Swelling     Sesame Oil Hives and Swelling     Sesame seeds-not oil.         Review of Systems:  -As per HPI  -Constitutional: Otherwise feeling well today, in usual state of health.  -Skin: As above in HPI. No additional skin concerns.    Physical exam:  GEN: This is a well developed, well-nourished female in no acute distress, in a pleasant mood.    SKIN: Focused examination of the face, upper chest, back, arms, hands was performed.  -Santiago skin type: IV  -There are superifical acneiform papules with intermixed open and closed comedones on the the forehead, cheeks, chin, jawline, and fewer amount on the upper chest and bilateral shoulders  - on the back there are numerous darker brown macules   -No other lesions of concern on areas examined.       Impression/Plan:  1. Acne vulgaris, inflammatory +/- hormonal component (has nexplanon). Given hormonal component, ideally would like to start spironolactone, however, with patient's POTS and hypotensive episodes, this would not be ideal. Will focus on inflammatory nature for now and reassuringly we note she previously responded well to topical regimen in past. If no improvement, consider isotretinoin.   - start tretinoin 0.05% cream  nightly  - start clindamycin 1% lotion daily   - start BPO 5% wash daily  - future: consider isotretinoin, likely avoid spironolactone due to h/o POTS    Follow-up in 3 months, earlier for new or changing lesions.       Dr. Ham staffed the patient.    Staff Involved:  Resident(Robbi)/Staff    Michael Culp MD, PhD  Med-Derm PGY-5    Staff Physician Comments:   I saw and evaluated the patient with the resident and I agree with the assessment and plan.  I was present for the examination.    Jo Ann Ham MD    Department of Dermatology  [facility]: Phone: [phone], Fax: [phone]  11/13/2020

## 2020-11-12 NOTE — PROGRESS NOTES
"[facility] Dermatology Note      Dermatology Problem List:  # Pertinent PMH: POTS.  1. Acne vulgaris, inflammatory +/- isotretinoin.  - tretinoin 0.05% cream nightly  - clindamcyin lotion  - BPO 5% wash daily in shower  - future: consider isotretinoin, likely avoid spironolactone due to h/o POTS    Encounter Date: Nov 12, 2020    CC:   Chief Complaint   Patient presents with     Derm Problem     Cyndi is here for concerns of acne on her face, arms, chest, and back.           History of Present Illness:  Ms. Cyndi Grady is a 20 year old female who presents in self referral for acne vulgaris. Patient states that she has had acne ever since she was 12. She says that she previously saw a Dermatologist for this and was prescribed a \"wash and a cream\" that she used faithfully and it completely resolved her acne. She stopped using it because her acne was so well controlled. She can't remember exactly how long ago she stopped using it. She reports that over the last year her acne has become a big problem for her again. She denies any association with her menstrual cycle, but admits that her menses are very irregular. She uses Nexplanon for birth control and thinks her acne may have gotten slightly worse with this. She says her acne affects her entire face, but also her upper chest, upper arms, and has in the past affected her entire back. She mentions that she uses at least two different OTC facial washes, uses a niacinamide serum, and moisturizers for her face.     She states that she has postural orthostatic tachycardia syndrome and frequently feels lightheaded due to hypotension. She is not on medications for this, but has been instructed to increase her fluid and salt intake.   She has no other health concerns or skin concerns today and says that she is feeling well.      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 She has never used smokeless tobacco. She reports current drug use. Drug: Marijuana. She reports that she does not drink alcohol.    Family History:  Family History   Problem Relation Age of Onset     Melanoma No family hx of      Skin Cancer No family hx of        Medications:  Current Outpatient Medications   Medication Sig Dispense Refill     albuterol (PROAIR HFA/PROVENTIL HFA/VENTOLIN HFA) 108 (90 Base) MCG/ACT inhaler Inhale 2 puffs into the lungs every 6 hours 18 g 1     albuterol (PROAIR HFA/PROVENTIL HFA/VENTOLIN HFA) 108 (90 Base) MCG/ACT inhaler INHALE 2 PUFFS BY MOUTH EVERY 6 HOURS AS NEEDED FOR SHORTNESS OF BREATH /DYSPNEA  OR  WHEEZING 18 g 0     budesonide-formoterol (SYMBICORT) 80-4.5 MCG/ACT Inhaler Inhale 2 puffs by mouth twice daily 11 g 0     EPINEPHrine (ADRENACLICK) 0.3 MG/0.3ML injection 2-pack Inject 0.3 mLs (0.3 mg) into the muscle as needed for anaphylaxis 0.6 mL 1     EPINEPHrine (EPIPEN/ADRENACLICK/OR ANY BX GENERIC EQUIV) 0.3 MG/0.3ML injection 2-pack Inject 0.3 mLs (0.3 mg) into the muscle as needed for anaphylaxis 0.6 mL 1     loratadine-pseudoePHEDrine (CLARITIN-D 24-HOUR)  MG 24 hr tablet Take 1 tablet by mouth daily Reported on 3/16/2017 14 tablet 11     mometasone (NASONEX) 50 MCG/ACT nasal spray Spray 2 sprays into both nostrils daily 17 g 3     triamcinolone (KENALOG) 0.1 % external ointment Apply sparingly to affected area three times daily for 14 days.  Not to be used on the face. 15 g 3     tretinoin (RETIN-A)  0.025 % cream Spread a pea size amount into affected area topically at bedtime.  Use sunscreen SPF>20. (Patient not taking: Reported on 9/22/2020) 45 g 11        Allergies   Allergen Reactions     Cats      Dogs      Egg [Chicken-Derived Products (Egg)]      Milk Protein Extract      Abdominal pain.     Nuts [Peanut-Derived] Hives and Swelling     Tree nuts-Cashew, pecans,walnuts.     Peanuts [Nuts] Hives and Swelling     Sesame Oil Hives and Swelling     Sesame seeds-not oil.         Review of Systems:  -As per HPI  -Constitutional: Otherwise feeling well today, in usual state of health.  -Skin: As above in HPI. No additional skin concerns.    Physical exam:  GEN: This is a well developed, well-nourished female in no acute distress, in a pleasant mood.    SKIN: Focused examination of the face, upper chest, back, arms, hands was performed.  -Santiago skin type: IV  -There are superifical acneiform papules with intermixed open and closed comedones on the the forehead, cheeks, chin, jawline, and fewer amount on the upper chest and bilateral shoulders  - on the back there are numerous darker brown macules   -No other lesions of concern on areas examined.       Impression/Plan:  1. Acne vulgaris, inflammatory +/- hormonal component (has nexplanon). Given hormonal component, ideally would like to start spironolactone, however, with patient's POTS and hypotensive episodes, this would not be ideal. Will focus on inflammatory nature for now and reassuringly we note she previously responded well to topical regimen in past. If no improvement, consider isotretinoin.   - start tretinoin 0.05% cream nightly  - start clindamycin 1% lotion daily   - start BPO 5% wash daily  - future: consider isotretinoin, likely avoid spironolactone due to h/o POTS    Follow-up in 3 months, earlier for new or changing lesions.       Dr. Ham staffed the patient.    Staff Involved:  Resident(Robbi)/Staff    Michael Culp MD, PhD  Med-Derm  PGY-5    Staff Physician Comments:   I saw and evaluated the patient with the resident and I agree with the assessment and plan.  I was present for the examination.    Jo Ann Ham MD    Department of Dermatology  [facility] [place]: Phone: [phone], Fax: [phone]  11/13/2020

## 2020-11-13 RX ORDER — CLINDAMYCIN PHOSPHATE 10 UG/ML
LOTION TOPICAL DAILY
Qty: 60 ML | Refills: 11 | Status: SHIPPED | OUTPATIENT
Start: 2020-11-13 | End: 2023-05-16

## 2020-11-13 RX ORDER — TRETINOIN 0.5 MG/G
CREAM TOPICAL AT BEDTIME
Qty: 45 G | Refills: 2 | Status: SHIPPED | OUTPATIENT
Start: 2020-11-13 | End: 2022-01-19

## 2020-11-16 ENCOUNTER — HEALTH MAINTENANCE LETTER (OUTPATIENT)
Age: 20
End: 2020-11-16

## 2021-01-04 ENCOUNTER — OFFICE VISIT (OUTPATIENT)
Dept: FAMILY MEDICINE | Facility: CLINIC | Age: 21
End: 2021-01-04
Payer: COMMERCIAL

## 2021-01-04 VITALS
SYSTOLIC BLOOD PRESSURE: 118 MMHG | TEMPERATURE: 97.8 F | BODY MASS INDEX: 20.85 KG/M2 | HEART RATE: 64 BPM | WEIGHT: 143.5 LBS | DIASTOLIC BLOOD PRESSURE: 79 MMHG | OXYGEN SATURATION: 97 %

## 2021-01-04 DIAGNOSIS — F32.1 MODERATE MAJOR DEPRESSION (H): ICD-10-CM

## 2021-01-04 DIAGNOSIS — F41.1 GENERALIZED ANXIETY DISORDER: ICD-10-CM

## 2021-01-04 DIAGNOSIS — Z00.00 WELL ADULT EXAM: Primary | ICD-10-CM

## 2021-01-04 DIAGNOSIS — R53.83 OTHER FATIGUE: ICD-10-CM

## 2021-01-04 DIAGNOSIS — E55.9 VITAMIN D DEFICIENCY: ICD-10-CM

## 2021-01-04 LAB
DEPRECATED CALCIDIOL+CALCIFEROL SERPL-MC: 8 UG/L (ref 20–75)
FERRITIN SERPL-MCNC: 106 NG/ML (ref 12–150)
IRON SATN MFR SERPL: 41 % (ref 15–46)
IRON SERPL-MCNC: 148 UG/DL (ref 35–180)
TIBC SERPL-MCNC: 362 UG/DL (ref 240–430)
TSH SERPL DL<=0.005 MIU/L-ACNC: 1.46 MU/L (ref 0.4–4)
VIT B12 SERPL-MCNC: 280 PG/ML (ref 193–986)

## 2021-01-04 PROCEDURE — 82607 VITAMIN B-12: CPT | Performed by: PATHOLOGY

## 2021-01-04 PROCEDURE — 83550 IRON BINDING TEST: CPT | Performed by: PATHOLOGY

## 2021-01-04 PROCEDURE — 36415 COLL VENOUS BLD VENIPUNCTURE: CPT | Performed by: PATHOLOGY

## 2021-01-04 PROCEDURE — 99385 PREV VISIT NEW AGE 18-39: CPT | Performed by: NURSE PRACTITIONER

## 2021-01-04 PROCEDURE — 82306 VITAMIN D 25 HYDROXY: CPT | Mod: 90 | Performed by: PATHOLOGY

## 2021-01-04 PROCEDURE — 83540 ASSAY OF IRON: CPT | Performed by: PATHOLOGY

## 2021-01-04 PROCEDURE — 82728 ASSAY OF FERRITIN: CPT | Performed by: PATHOLOGY

## 2021-01-04 PROCEDURE — 99000 SPECIMEN HANDLING OFFICE-LAB: CPT | Performed by: PATHOLOGY

## 2021-01-04 PROCEDURE — 84443 ASSAY THYROID STIM HORMONE: CPT | Performed by: PATHOLOGY

## 2021-01-04 ASSESSMENT — ANXIETY QUESTIONNAIRES
2. NOT BEING ABLE TO STOP OR CONTROL WORRYING: SEVERAL DAYS
6. BECOMING EASILY ANNOYED OR IRRITABLE: SEVERAL DAYS
5. BEING SO RESTLESS THAT IT IS HARD TO SIT STILL: SEVERAL DAYS
GAD7 TOTAL SCORE: 8
IF YOU CHECKED OFF ANY PROBLEMS ON THIS QUESTIONNAIRE, HOW DIFFICULT HAVE THESE PROBLEMS MADE IT FOR YOU TO DO YOUR WORK, TAKE CARE OF THINGS AT HOME, OR GET ALONG WITH OTHER PEOPLE: SOMEWHAT DIFFICULT
1. FEELING NERVOUS, ANXIOUS, OR ON EDGE: SEVERAL DAYS
7. FEELING AFRAID AS IF SOMETHING AWFUL MIGHT HAPPEN: SEVERAL DAYS
3. WORRYING TOO MUCH ABOUT DIFFERENT THINGS: SEVERAL DAYS

## 2021-01-04 ASSESSMENT — PATIENT HEALTH QUESTIONNAIRE - PHQ9
SUM OF ALL RESPONSES TO PHQ QUESTIONS 1-9: 11
5. POOR APPETITE OR OVEREATING: MORE THAN HALF THE DAYS

## 2021-01-04 ASSESSMENT — PAIN SCALES - GENERAL: PAINLEVEL: MODERATE PAIN (4)

## 2021-01-04 NOTE — PROGRESS NOTES
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 Zuniga     Family History   Problem Relation Age of Onset     Melanoma No family hx of      Skin Cancer No family hx of             Review of Systems:     Review of Systems:  CONSTITUTIONAL: NEGATIVE for fever, chills, change in weight  INTEGUMENTARY/SKIN: NEGATIVE for worrisome rashes, moles or lesions  EYES: NEGATIVE for vision changes or irritation  ENT/MOUTH: NEGATIVE for ear, mouth and throat problems  RESP: NEGATIVE for significant cough or SOB  BREAST: NEGATIVE for masses, tenderness or discharge  CV: NEGATIVE for chest pain, palpitations or peripheral edema  GI: NEGATIVE for nausea, abdominal pain, heartburn, or change in bowel habits  : NEGATIVE for frequency, dysuria, or hematuria  MUSCULOSKELETAL: NEGATIVE for significant arthralgias or myalgia  NEURO: NEGATIVE for weakness, dizziness or paresthesias  ENDOCRINE: NEGATIVE for temperature intolerance, skin/hair changes  HEME/ALLERGY: NEGATIVE for bleeding problems  PSYCHIATRIC: NEGATIVE for changes in mood or affect  Sleep:   Do you snore most or the night (as reported by a family member)? No  Do you feel sleepy or extremely tired during most of the day? Yes         Social History     Social History     Socioeconomic History     Marital status: Single     Spouse name: Not on file     Number of children: Not on file     Years of education: Not on file     Highest education level: Not on file   Occupational History     Not on file    Social Needs     Financial resource strain: Not on file     Food insecurity     Worry: Not on file     Inability: Not on file     Transportation needs     Medical: Not on file     Non-medical: Not on file   Tobacco Use     Smoking status: Never Smoker     Smokeless tobacco: Never Used   Substance and Sexual Activity     Alcohol use: No     Alcohol/week: 0.0 standard drinks     Drug use: Yes     Types: Marijuana     Sexual activity: Yes     Partners: Male     Comment: STD screen at gynecologist office early June, 2017   Lifestyle     Physical activity     Days per week: Not on file     Minutes per session: Not on file     Stress: Not on file   Relationships     Social connections     Talks on phone: Not on file     Gets together: Not on file     Attends Nondenominational service: Not on file     Active member of club or organization: Not on file     Attends meetings of clubs or organizations: Not on file     Relationship status: Not on file     Intimate partner violence     Fear of current or ex partner: Not on file     Emotionally abused: Not on file     Physically abused: Not on file     Forced sexual activity: Not on file   Other Topics Concern     Parent/sibling w/ CABG, MI or angioplasty before 65F 55M? Not Asked   Social History Narrative     Not on file     *She is at [employer] getting an Associates Degree in [employer].   Marital Status:Single-sexually active with males, STD testing November, 2020  Who lives in your household? Apartment-lives by self    Has anyone hurt you physically, for example by pushing, hitting, slapping or kicking you or forcing you to have sex? Denies  Do you feel threatened or controlled by a partner, ex-partner or anyone in your life? Denies    Sexual Health     Sexual concerns: No   STI History: Neg  Pregnancy History: No obstetric history on file.  LMP:December 1, 2020  Last Pap Smear Date: No results found for: PAP  Abnormal Pap History: None    Recommended Screening   Labs  pending.        Physical Exam:     Vitals: /79   Pulse 64   Temp 97.8  F (36.6  C) (Oral)   Wt 65.1 kg (143 lb 8 oz)   SpO2 97%   BMI 20.85 kg/m    BMI= Body mass index is 20.85 kg/m .   GENERAL: healthy, alert and no distress  EYES: Eyes grossly normal to inspection, extraocular movements - intact, and PERRL  HENT: ear canals- normal; TMs- normal; Nose- normal; Mouth- no ulcers, no lesions  NECK: no tenderness, no adenopathy, no asymmetry, no masses, no stiffness; thyroid- normal to palpation  RESP: lungs clear to auscultation - no rales, no rhonchi, no wheezes  CV: regular rates and rhythm, normal S1 S2, no S3 or S4 and no murmur, no click or rub -  ABDOMEN: soft, no tenderness, no  hepatosplenomegaly, no masses, normal bowel sounds  MS: extremities- no gross deformities noted, no edema  SKIN: no suspicious lesions, no rashes  NEURO: strength and tone- normal, sensory exam- grossly normal, mentation- intact, speech- normal, reflexes- symmetric  BACK: no CVA tenderness, no paralumbar tenderness  RECTAL- female: no masses, 1 small hemorrhoid at bottom of anus  PSYCH: Alert and oriented times 3; speech- coherent , normal rate and volume; able to articulate logical thoughts, able to abstract reason, no tangential thoughts, no hallucinations or delusions, affect- normal  LYMPHATICS: ant. cervical- normal, post. cervical- normal, axillary- normal, supraclavicular- normal, inguinal- normal    Assessment and Plan      Cyndi was seen today for physical and establish care.    Diagnoses and all orders for this visit:    Well adult exam    Generalized anxiety disorder  -     TSH; Future    Moderate major depression (H)  -     TSH; Future    Other fatigue  -     Vitamin B12; Future  -     Iron and iron binding capacity; Future  -     Ferritin; Future    Vitamin D deficiency  -     Vitamin D Deficiency; Future      1. Health Care Maintenance: Normal Physical Exam      1. Routine follow up in one  "year.  2.Contraception: Details: Nexplanon    First, have your labs completed today. Next, consider 5 healthy habits; eat clean, drink 70 oz water daily, sleep 7-8 hrs per noc, exercise 30\" 4 times per week, manage your stress and anxiety. Consider seeing a counselor. Finally, consider L-thianine 100 mg twice daily (any pharmacy) and Vit d 5000 IUs daily till you follow up with me in one week. Thank you. Options for treatment and follow-up care were reviewed with the patient . Cyndi Wick Grady and/or guardian engaged in the decision making process and verbalized understanding of the options discussed and agreed with the final plan.  CHEKO Moseley, CNP    "

## 2021-01-04 NOTE — PATIENT INSTRUCTIONS
"First, have your labs completed today. Next, consider 5 healthy habits; eat clean, drink 70 oz water daily, sleep 7-8 hrs per noc, exercise 30\" 4 times per week, manage your stress and anxiety. Consider seeing a counselor. Finally, consider L-thianine 100 mg twice daily (any pharmacy) and Vit d 5000 IUs daily till you follow up with me in one week. Thank you.   [facility] Medication Refill Request Information:  * Please contact your pharmacy regarding ANY request for medication refills.  ** NP Clinic Prescription Fax = [phone]  * Please allow 3 business days for routine medication refills.  * Please allow 5 business days for controlled substance medication refills.     [facility] Test Result notification information:  *You will be notified with in 7-10 days of your appointment day regarding the results of your test.  If you are on MyChart you will be notified as soon as the provider has reviewed the results and signed off on them.    [facility]: [phone]     "

## 2021-01-04 NOTE — NURSING NOTE
20 year old  Chief Complaint   Patient presents with     Physical     Pt is here for a physical.     Establish Care     Pt is here to establish care.       Blood pressure 118/79, pulse 64, temperature 97.8  F (36.6  C), temperature source Oral, weight 65.1 kg (143 lb 8 oz), SpO2 97 %, not currently breastfeeding. Body mass index is 20.85 kg/m .  BP completed using cuff size:      Leonarda Feliz, CASTRO  January 4, 2021 3:43 PM

## 2021-01-05 ASSESSMENT — ANXIETY QUESTIONNAIRES: GAD7 TOTAL SCORE: 8

## 2021-01-11 ENCOUNTER — VIRTUAL VISIT (OUTPATIENT)
Dept: FAMILY MEDICINE | Facility: CLINIC | Age: 21
End: 2021-01-11
Payer: COMMERCIAL

## 2021-01-11 DIAGNOSIS — E55.9 VITAMIN D DEFICIENCY: ICD-10-CM

## 2021-01-11 DIAGNOSIS — F41.1 GENERALIZED ANXIETY DISORDER: Primary | ICD-10-CM

## 2021-01-11 DIAGNOSIS — E53.8 VITAMIN B12 DEFICIENCY (NON ANEMIC): ICD-10-CM

## 2021-01-11 DIAGNOSIS — F32.1 MODERATE MAJOR DEPRESSION (H): ICD-10-CM

## 2021-01-11 PROCEDURE — 99213 OFFICE O/P EST LOW 20 MIN: CPT | Mod: GT | Performed by: NURSE PRACTITIONER

## 2021-01-11 ASSESSMENT — PAIN SCALES - GENERAL: PAINLEVEL: MILD PAIN (3)

## 2021-01-11 NOTE — NURSING NOTE
20 year old  Chief Complaint   Patient presents with     Recheck Medication     1 week follow up.       Leonarda Feliz, BEHZAD  January 11, 2021 3:35 PM

## 2021-01-11 NOTE — PATIENT INSTRUCTIONS
First, please consider counseling and psychiatry. Next, please consider L Thianine 100 mg twice daily, Vit d 10,000 IUs daily for one month, than 5,000 IUs daily after that. Finally, please take a women's vitamin that has B12 in it. Thank you.   [facility] Medication Refill Request Information:  * Please contact your pharmacy regarding ANY request for medication refills.  ** NP Clinic Prescription Fax = [phone]  * Please allow 3 business days for routine medication refills.  * Please allow 5 business days for controlled substance medication refills.     [facility] Test Result notification information:  *You will be notified with in 7-10 days of your appointment day regarding the results of your test.  If you are on MyChart you will be notified as soon as the provider has reviewed the results and signed off on them.    [facility]: [phone]

## 2021-01-11 NOTE — PROGRESS NOTES
Cyndi is a 20 year old who is being evaluated via a billable video visit.      How would you like to obtain your AVS? OvertoneharMiracleCord  If the video visit is dropped, the invitation should be resent by: Other e-mail: [email or web address]  Will anyone else be joining your video visit? No      Video Start Time: 1542    Subjective     Cyndi is a 20 year old who presents to clinic today for follow up after her physical exam.     HPI     Anxiety and Depression: Cyndi spoke of her depression and anxiety at her annual physical. She is here to follow up on these items today. She is not currently taking any medication. She is working on 5 healthy habits.   Vitamin D and B12 deficiencies:Vit d is low at 8 and B12 is on the low end of normal at 280. She is not currently taking any vit d or B12.     Review of Systems   Constitutional, HEENT, cardiovascular, pulmonary, gi and gu systems are negative, except as otherwise noted.      Objective    Vitals - Patient Reported  Pain Score: Mild Pain (3)    Physical Exam   GENERAL: Healthy, alert and no distress  EYES: Eyes grossly normal to inspection.  No discharge or erythema, or obvious scleral/conjunctival abnormalities.  RESP: No audible wheeze, cough, or visible cyanosis.  No visible retractions or increased work of breathing.    SKIN: Visible skin clear. No significant rash, abnormal pigmentation or lesions.  NEURO: Cranial nerves grossly intact.  Mentation and speech appropriate for age.  PSYCH: Mentation appears normal, affect normal/bright, judgement and insight intact, normal speech and appearance well-groomed.    Component      Latest Ref Rng & Units 1/4/2021   Iron      35 - 180 ug/dL 148   Iron Binding Cap      240 - 430 ug/dL 362   Iron Saturation Index      15 - 46 % 41   Ferritin      12 - 150 ng/mL 106   TSH      0.40 - 4.00 mU/L 1.46   Vitamin B12      193 - 986 pg/mL 280   Vitamin D Deficiency screening      20 - 75 ug/L 8 (L)     YUN-7 SCORE 11/20/2019 12/3/2020 1/4/2021   Total Score -  6 (mild anxiety) -   Total Score 6 6 8       PHQ 11/20/2019 9/22/2020 1/4/2021   PHQ-9 Total Score - 7 11   Q9: Thoughts of better off dead/self-harm past 2 weeks - Not at all Not at all   PHQ-A Total Score 9 - -   PHQ-A Depressed most days in past year No - -   PHQ-A Mood affect on daily activities Somewhat difficult - -   PHQ-A Suicide Ideation past 2 weeks Not at all - -   PHQ-A Suicide Ideation past month No - -   PHQ-A Previous suicide attempt No - -       Assessment & Plan     1.Anxiety  2.Moderate Depression  3.Vit D Deficiency  4.Vit B12    Video-Visit Details    Type of service:  Video Visit    Video End Time:1553    Originating Location (pt. Location): Home    Distant Location (provider location):  [facility] [place]     Platform used for Video Visit: [email or web address]    Patient instructions:First, please consider counseling and psychiatry. Next, please consider L Thianine 100 mg twice daily, Vit d 10,000 IUs daily for one month, than 5,000 IUs daily after that. Finally, please take a women's vitamin that has B12 in it. Thank you. Follow up on your overall status in 1 month. She verbalizes understanding of the plan.   CHEKO Moseley, CNP

## 2021-01-12 ENCOUNTER — TELEPHONE (OUTPATIENT)
Dept: FAMILY MEDICINE | Facility: CLINIC | Age: 21
End: 2021-01-12

## 2021-01-13 ENCOUNTER — TELEPHONE (OUTPATIENT)
Dept: FAMILY MEDICINE | Facility: CLINIC | Age: 21
End: 2021-01-13

## 2021-02-10 ENCOUNTER — OFFICE VISIT (OUTPATIENT)
Dept: INTERNAL MEDICINE | Facility: CLINIC | Age: 21
End: 2021-02-10
Payer: COMMERCIAL

## 2021-02-10 VITALS
DIASTOLIC BLOOD PRESSURE: 81 MMHG | WEIGHT: 150 LBS | BODY MASS INDEX: 21.79 KG/M2 | SYSTOLIC BLOOD PRESSURE: 121 MMHG | HEART RATE: 76 BPM | OXYGEN SATURATION: 94 %

## 2021-02-10 DIAGNOSIS — F43.22 ADJUSTMENT DISORDER WITH ANXIOUS MOOD: Primary | ICD-10-CM

## 2021-02-10 PROCEDURE — 99204 OFFICE O/P NEW MOD 45 MIN: CPT | Performed by: INTERNAL MEDICINE

## 2021-02-10 RX ORDER — FAMOTIDINE 40 MG/1
TABLET, FILM COATED ORAL
COMMUNITY
Start: 2021-01-07 | End: 2021-12-22

## 2021-02-10 RX ORDER — HYDROXYZINE HYDROCHLORIDE 25 MG/1
25 TABLET, FILM COATED ORAL 3 TIMES DAILY PRN
Qty: 90 TABLET | Refills: 3 | Status: SHIPPED | OUTPATIENT
Start: 2021-02-10 | End: 2024-05-07

## 2021-02-10 NOTE — NURSING NOTE
Chief Complaint   Patient presents with     Establish Care     Neyda Elias LPN at 2:46 PM on 2/10/2021.

## 2021-02-10 NOTE — PROGRESS NOTES
Cyndi is a very pleasant 20 year old female who is here to establish care.    She has a past medical history of Anxiety, Depression (01/101/2013), Depressive disorder, POTS (postural orthostatic tachycardia syndrome) (01/2012), Acne and Uncomplicated asthma.     Recently had a full physical end of January and labs all looked good except for a very low vitamin D level.  Asthma is stable, no recent flare.    Still feeling fatigued which is what brought her in to begin with. Also endorses anxious mood.  She is having some right sided hip and leg pain as well, initially was doing home PT and home yoga then started to neglect doing it.     Current Outpatient Medications:      albuterol (PROAIR HFA/PROVENTIL HFA/VENTOLIN HFA) 108 (90 Base) MCG/ACT inhaler, Inhale 2 puffs into the lungs every 6 hours, Disp: 18 g, Rfl: 1     albuterol (PROAIR HFA/PROVENTIL HFA/VENTOLIN HFA) 108 (90 Base) MCG/ACT inhaler, INHALE 2 PUFFS BY MOUTH EVERY 6 HOURS AS NEEDED FOR SHORTNESS OF BREATH /DYSPNEA  OR  WHEEZING, Disp: 18 g, Rfl: 0     benzoyl peroxide 5 % external liquid, Use daily as directed. Use for face, chest, arms, back., Disp: 226 g, Rfl: 2     budesonide-formoterol (SYMBICORT) 80-4.5 MCG/ACT Inhaler, Inhale 2 puffs by mouth twice daily, Disp: 11 g, Rfl: 0     clindamycin (CLEOCIN T) 1 % external lotion, Apply topically daily, Disp: 60 mL, Rfl: 11     EPINEPHrine (ADRENACLICK) 0.3 MG/0.3ML injection 2-pack, Inject 0.3 mLs (0.3 mg) into the muscle as needed for anaphylaxis, Disp: 0.6 mL, Rfl: 1     EPINEPHrine (EPIPEN/ADRENACLICK/OR ANY BX GENERIC EQUIV) 0.3 MG/0.3ML injection 2-pack, Inject 0.3 mLs (0.3 mg) into the muscle as needed for anaphylaxis, Disp: 0.6 mL, Rfl: 1     loratadine-pseudoePHEDrine (CLARITIN-D 24-HOUR)  MG 24 hr tablet, Take 1 tablet by mouth daily Reported on 3/16/2017, Disp: 14 tablet, Rfl: 11     mometasone (NASONEX) 50 MCG/ACT nasal spray, Spray 2 sprays into both nostrils daily, Disp: 17 g, Rfl: 3      tretinoin (RETIN-A) 0.025 % cream, Spread a pea size amount into affected area topically at bedtime.  Use sunscreen SPF>20. (Patient not taking: Reported on 9/22/2020), Disp: 45 g, Rfl: 11     tretinoin (RETIN-A) 0.05 % external cream, Apply topically At Bedtime, Disp: 45 g, Rfl: 2     triamcinolone (KENALOG) 0.1 % external ointment, Apply sparingly to affected area three times daily for 14 days.  Not to be used on the face., Disp: 15 g, Rfl: 3    Family history: maternal grandmother with breast cancer, type II DM.  Mother has endometriosis.  SH:  Taking on line classes at [employer].  ROS: 10 point ROS neg other than the symptoms noted above in the HPI.    A/P Cyndi was seen today for establish care, anxiety, fatigue, right sided hip/leg pain.  I recommend that she continue to replete her very low vitamin D stores and reassess symptoms in about two months.  If still bothersome, consider sleep evaluation to rule out sleep disorder; consider Ortho referral to diagnose IT band syndrome or other cause of hip and leg pain such as bursitis.    For POTS syndrome, I also recommend compression garments that extend from the waist to above the knee (bike shorts, spanx) in addition to fluid intake and liberal sodium.    We discussed adding back in regular exercise.    Adjustment disorder with anxious mood  -     MENTAL HEALTH REFERRAL  - Adult; Outpatient Treatment; Individual/Couples/Family/Group Therapy/Health Psychology; [facility]: [facility] Psychology - Deedee Lei [phone]; Patient call to schedule  -     hydrOXYzine (ATARAX) 25 MG tablet; Take 1 tablet (25 mg) by mouth 3 times daily as needed for anxiety    RTC for virtual visit in two months.  Total time spent with Cyndi was 30 minutes with another 15 minutes chart review, documentation, and  same day.     Nicolasa Henderson MD

## 2021-02-22 ENCOUNTER — ANCILLARY PROCEDURE (OUTPATIENT)
Dept: GENERAL RADIOLOGY | Facility: CLINIC | Age: 21
End: 2021-02-22
Attending: NURSE PRACTITIONER
Payer: COMMERCIAL

## 2021-02-22 ENCOUNTER — OFFICE VISIT (OUTPATIENT)
Dept: FAMILY MEDICINE | Facility: CLINIC | Age: 21
End: 2021-02-22
Payer: COMMERCIAL

## 2021-02-22 VITALS
WEIGHT: 150 LBS | OXYGEN SATURATION: 99 % | TEMPERATURE: 98.4 F | DIASTOLIC BLOOD PRESSURE: 77 MMHG | BODY MASS INDEX: 21.79 KG/M2 | SYSTOLIC BLOOD PRESSURE: 119 MMHG | HEART RATE: 80 BPM

## 2021-02-22 DIAGNOSIS — L29.0 RECTAL ITCHING: ICD-10-CM

## 2021-02-22 DIAGNOSIS — K64.4 EXTERNAL HEMORRHOIDS: Primary | ICD-10-CM

## 2021-02-22 DIAGNOSIS — K59.09 OTHER CONSTIPATION: ICD-10-CM

## 2021-02-22 DIAGNOSIS — K64.9 BLEEDING HEMORRHOID: ICD-10-CM

## 2021-02-22 PROCEDURE — 74018 RADEX ABDOMEN 1 VIEW: CPT | Mod: GC | Performed by: RADIOLOGY

## 2021-02-22 PROCEDURE — 99214 OFFICE O/P EST MOD 30 MIN: CPT | Performed by: NURSE PRACTITIONER

## 2021-02-22 ASSESSMENT — ANXIETY QUESTIONNAIRES
IF YOU CHECKED OFF ANY PROBLEMS ON THIS QUESTIONNAIRE, HOW DIFFICULT HAVE THESE PROBLEMS MADE IT FOR YOU TO DO YOUR WORK, TAKE CARE OF THINGS AT HOME, OR GET ALONG WITH OTHER PEOPLE: SOMEWHAT DIFFICULT
5. BEING SO RESTLESS THAT IT IS HARD TO SIT STILL: NOT AT ALL
2. NOT BEING ABLE TO STOP OR CONTROL WORRYING: NOT AT ALL
GAD7 TOTAL SCORE: 3
3. WORRYING TOO MUCH ABOUT DIFFERENT THINGS: NOT AT ALL
7. FEELING AFRAID AS IF SOMETHING AWFUL MIGHT HAPPEN: NOT AT ALL
6. BECOMING EASILY ANNOYED OR IRRITABLE: SEVERAL DAYS
1. FEELING NERVOUS, ANXIOUS, OR ON EDGE: SEVERAL DAYS

## 2021-02-22 ASSESSMENT — PAIN SCALES - GENERAL: PAINLEVEL: MODERATE PAIN (5)

## 2021-02-22 ASSESSMENT — PATIENT HEALTH QUESTIONNAIRE - PHQ9
5. POOR APPETITE OR OVEREATING: SEVERAL DAYS
SUM OF ALL RESPONSES TO PHQ QUESTIONS 1-9: 11

## 2021-02-22 NOTE — PROGRESS NOTES
lc Grady is a 20 year old woman who presents with multiple concerns:   Chief Complaint   Patient presents with     Abdominal Pain     Pt has lower abdominal pain and burning urination.   Hemorrhoid/bleeding/rectal itching; Pea sized external hemorrhoid has been present for the past 2 years; she has not used any topicals. She has made diet changes to add fiber. She does have intermittent bleeding as well.   Abdominal pain/Constipation: She has some generalized stomach pain, recently this has been more. She has been eating more greens and vegetables. She has not taken anything oral like Mirilax or probiotics to regulate her Bowels. She does try to drink 1/2 her body weight in water daily.       Problem, Medication and Allergy Lists were reviewed and updated if needed.    Patient is an established patient of this clinic.           Review of Systems:   Review of Systems     Constitutional:  Negative for fever, chills and fatigue.   Gastrointestinal:  Positive for abdominal pain, rectal pain, hemorrhoids and rectal bleeding.               Physical Exam:     Vitals:    02/22/21 1613   BP: 119/77   Pulse: 80   Temp: 98.4  F (36.9  C)   SpO2: 99%   Weight: 68 kg (150 lb)     Body mass index is 21.79 kg/m .  Vitals were reviewed and were normal.     Physical Exam  Vitals signs reviewed.   Constitutional:       Appearance: Normal appearance.   HENT:      Head: Normocephalic.   Abdominal:      General: Abdomen is flat. Bowel sounds are normal.      Palpations: Abdomen is soft.   Genitourinary:         Comments: Pea sized external hemorrhoid.   Neurological:      Mental Status: She is alert.           Results:     Abdominal X ray pending.     Assessment and Plan     1. External hemorrhoids    - phenylephrine-shark liver oil-mineral oil-petrolatum (PREPARATION H) 0.25-3-14-71.9 % rectal ointment; Place rectally 2 times daily as needed for hemorrhoids  Dispense: 15 g; Refill: 1    2. Bleeding  hemorrhoid    - phenylephrine-shark liver oil-mineral oil-petrolatum (PREPARATION H) 0.25-3-14-71.9 % rectal ointment; Place rectally 2 times daily as needed for hemorrhoids  Dispense: 15 g; Refill: 1    3. Rectal itching    - phenylephrine-shark liver oil-mineral oil-petrolatum (PREPARATION H) 0.25-3-14-71.9 % rectal ointment; Place rectally 2 times daily as needed for hemorrhoids  Dispense: 15 g; Refill: 1    4. Other constipation    - XR Abdomen 1 View; Future    There are no discontinued medications.    I spent a total of 30 minutes face-to-face with Cyndi during today's office visit.  Over 50% of this time was spent counseling the patient and/or coordinating care regarding their care.  See note for details. First, Cyndi will continue to make diet changes, increasing fiber and drinking more water while being as active as possible. Next, I will collaborate with GI NP on options for removal vs treatment. Next, Cyndi will have an x ray and recommendations for constipation with be made based on results of the x ray. Options for treatment and follow-up care were reviewed with the patient. Cyndi Grady  engaged in the decision making process and verbalized understanding of the options discussed and agreed with the final plan.  CHEKO Moseley, CNP  Addendum 02/23/2021:Called, she did not answer. I did send a my chart message to her to explain x ray results and recommendations.   CHEKO Moseley, CNP

## 2021-02-22 NOTE — PATIENT INSTRUCTIONS
[facility] Medication Refill Request Information:  * Please contact your pharmacy regarding ANY request for medication refills.  ** NP Clinic Prescription Fax = [phone]  * Please allow 3 business days for routine medication refills.  * Please allow 5 business days for controlled substance medication refills.     [facility] Test Result notification information:  *You will be notified with in 7-10 days of your appointment day regarding the results of your test.  If you are on MyChart you will be notified as soon as the provider has reviewed the results and signed off on them.    [facility]: [phone]     If you have questions regarding Covid-19 and the Covid-19 vaccine, please visit this website.    [email or web address]

## 2021-02-22 NOTE — NURSING NOTE
20 year old  Chief Complaint   Patient presents with     Abdominal Pain     Pt has lower abdominal pain and burning urination.       Blood pressure 119/77, pulse 80, temperature 98.4  F (36.9  C), weight 68 kg (150 lb), SpO2 99 %, not currently breastfeeding. Body mass index is 21.79 kg/m .  BP completed using cuff size:      Leonarda Feliz, CASTRO  February 22, 2021 4:16 PM

## 2021-02-23 ENCOUNTER — TELEPHONE (OUTPATIENT)
Dept: SURGERY | Facility: CLINIC | Age: 21
End: 2021-02-23

## 2021-02-23 ASSESSMENT — ENCOUNTER SYMPTOMS
CHILLS: 0
FEVER: 0
FATIGUE: 0
RECTAL BLEEDING: 1
ABDOMINAL PAIN: 1
RECTAL PAIN: 1

## 2021-02-23 ASSESSMENT — ANXIETY QUESTIONNAIRES: GAD7 TOTAL SCORE: 3

## 2021-02-23 NOTE — TELEPHONE ENCOUNTER
Called to schedule appointment for consultation. Patient answered phone call and scheduled appointment with Dr. Huber.    Noemí Barboza, EMT  Colon and Rectal Surgery

## 2021-02-23 NOTE — TELEPHONE ENCOUNTER
----- Message from CHEKO Sorenson CNP sent at 2/23/2021 10:09 AM CST -----  Can we get her in for exam, next available is fine.  E  ----- Message -----  From: Lexie Chung NP  Sent: 2/23/2021   7:01 AM CST  To: CHEKO Sorenson CNP,   I saw this patient yesterday afternoon. She has had an external hemorrhoid (pea sized) for 2 years. This causes her a lot of problems/discomfort. We are working on her constipation currently. I am wondering if you would like to see her to assess whether she is an appropriate candidate for removal. I am also wondering what the most effective treatment for comfort is currently. Let me know your thoughts. Thank you for your collaboration.   CHEKO Moseley, CNP

## 2021-02-24 NOTE — TELEPHONE ENCOUNTER
RECORDS RECEIVED FROM: Internal    DATE RECEIVED: 3/11/2021   NOTES STATUS DETAILS   OFFICE NOTE from referring provider  Internal Ov note 2/22/2021

## 2021-03-04 ENCOUNTER — TELEPHONE (OUTPATIENT)
Dept: FAMILY MEDICINE | Facility: CLINIC | Age: 21
End: 2021-03-04

## 2021-03-04 ENCOUNTER — OFFICE VISIT (OUTPATIENT)
Dept: FAMILY MEDICINE | Facility: CLINIC | Age: 21
End: 2021-03-04
Payer: COMMERCIAL

## 2021-03-04 VITALS
TEMPERATURE: 97.9 F | OXYGEN SATURATION: 99 % | RESPIRATION RATE: 18 BRPM | DIASTOLIC BLOOD PRESSURE: 81 MMHG | HEIGHT: 70 IN | SYSTOLIC BLOOD PRESSURE: 124 MMHG | BODY MASS INDEX: 21.05 KG/M2 | HEART RATE: 71 BPM | WEIGHT: 147 LBS

## 2021-03-04 DIAGNOSIS — Z11.3 SCREEN FOR STD (SEXUALLY TRANSMITTED DISEASE): Primary | ICD-10-CM

## 2021-03-04 DIAGNOSIS — B37.31 CANDIDIASIS OF VAGINA: Primary | ICD-10-CM

## 2021-03-04 LAB
SPECIMEN SOURCE: ABNORMAL
WET PREP SPEC: ABNORMAL

## 2021-03-04 PROCEDURE — 87491 CHLMYD TRACH DNA AMP PROBE: CPT | Performed by: PEDIATRICS

## 2021-03-04 PROCEDURE — 87340 HEPATITIS B SURFACE AG IA: CPT | Performed by: PEDIATRICS

## 2021-03-04 PROCEDURE — 87591 N.GONORRHOEAE DNA AMP PROB: CPT | Performed by: PEDIATRICS

## 2021-03-04 PROCEDURE — 99000 SPECIMEN HANDLING OFFICE-LAB: CPT | Performed by: PEDIATRICS

## 2021-03-04 PROCEDURE — 87389 HIV-1 AG W/HIV-1&-2 AB AG IA: CPT | Performed by: PEDIATRICS

## 2021-03-04 PROCEDURE — 36415 COLL VENOUS BLD VENIPUNCTURE: CPT | Performed by: PEDIATRICS

## 2021-03-04 PROCEDURE — 86803 HEPATITIS C AB TEST: CPT | Performed by: PEDIATRICS

## 2021-03-04 PROCEDURE — 86780 TREPONEMA PALLIDUM: CPT | Mod: 90 | Performed by: PEDIATRICS

## 2021-03-04 PROCEDURE — 99213 OFFICE O/P EST LOW 20 MIN: CPT | Performed by: PEDIATRICS

## 2021-03-04 PROCEDURE — 87210 SMEAR WET MOUNT SALINE/INK: CPT | Performed by: PEDIATRICS

## 2021-03-04 RX ORDER — FLUCONAZOLE 150 MG/1
150 TABLET ORAL ONCE
Qty: 1 TABLET | Refills: 0 | Status: SHIPPED | OUTPATIENT
Start: 2021-03-04 | End: 2021-03-04

## 2021-03-04 ASSESSMENT — MIFFLIN-ST. JEOR: SCORE: 1508.07

## 2021-03-04 ASSESSMENT — PAIN SCALES - GENERAL: PAINLEVEL: NO PAIN (0)

## 2021-03-04 NOTE — PROGRESS NOTES
"    Assessment & Plan     Screen for STD (sexually transmitted disease)  Labs as ordered  Counseled about safe sex, consistent use of condoms  Patient had a physical in Jan 2021 not due to another physical till Jan 2022 , turned 21 yesterday but refuses a Pap today  - Treponema Abs w Reflex to RPR and Titer  - NEISSERIA GONORRHOEA PCR  - CHLAMYDIA TRACHOMATIS PCR  - Wet prep  - Hepatitis C Screen Reflex to HCV RNA Quant and Genotype  - Hepatitis B surface antigen  - HIV Antigen Antibody Combo  Discussed warning signs of reasons to return  Patient understands and agrees with treatment and plan and had no further questions             See Patient Instructions    Return in about 2 months (around 5/4/2021), or if symptoms worsen or fail to improve.    Barbara Ontiveros MD  [facility] RAFFAELE Hanna is a 21 year old who presents for the following health issues    HPI        LMP end of January 21 yo female, new patient to provider, here because wants to be checked for STD because she has a new partner  Positive Condoms  Has a Nexplanon  Denies any symptoms  Has h/o genital herpes  Denies any fever, no abdominal pain, no vaginal drainage or lesions, no other symptoms          Review of Systems   Constitutional, HEENT, cardiovascular, pulmonary, gi and gu systems are negative, except as otherwise noted.      Objective    /81   Pulse 71   Temp 97.9  F (36.6  C) (Tympanic)   Resp 18   Ht 1.772 m (5' 9.75\")   Wt 66.7 kg (147 lb)   LMP 01/31/2021 (Approximate)   SpO2 99%   BMI 21.24 kg/m    Body mass index is 21.24 kg/m .  Physical Exam   GENERAL: healthy, alert and no distress  NECK: no adenopathy, no asymmetry, masses, or scars and thyroid normal to palpation  RESP: lungs clear to auscultation - no rales, rhonchi or wheezes  CV: regular rate and rhythm, normal S1 S2, no S3 or S4, no murmur, click or rub, no peripheral edema and peripheral pulses strong  ABDOMEN: soft, " nontender, no hepatosplenomegaly, no masses and bowel sounds normal

## 2021-03-04 NOTE — TELEPHONE ENCOUNTER
Called patient's cell number,  left message with [facility] to call back. phone number to call back.       When patient calls back, need to give message from Dr. Ontiveros below.    Jenelle Calles RN, [facility]

## 2021-03-04 NOTE — TELEPHONE ENCOUNTER
Please call and let patient know that wet prep showed some fungal   A prescription for fluconazole was sent to her pharmacy to be taken as ordered  If no improvement or any worsening please contact the clinic

## 2021-03-04 NOTE — TELEPHONE ENCOUNTER
Patient called back and this RN gave patient providers message below.   Patient understands this and will  Rx.     Tammy Iverson BSN, RN

## 2021-03-05 LAB
C TRACH DNA SPEC QL NAA+PROBE: NEGATIVE
HBV SURFACE AG SERPL QL IA: NONREACTIVE
HCV AB SERPL QL IA: NONREACTIVE
HIV 1+2 AB+HIV1 P24 AG SERPL QL IA: NONREACTIVE
N GONORRHOEA DNA SPEC QL NAA+PROBE: NEGATIVE
SPECIMEN SOURCE: NORMAL
SPECIMEN SOURCE: NORMAL
T PALLIDUM AB SER QL: NONREACTIVE

## 2021-03-11 ENCOUNTER — PRE VISIT (OUTPATIENT)
Dept: SURGERY | Facility: CLINIC | Age: 21
End: 2021-03-11

## 2021-04-12 ENCOUNTER — VIRTUAL VISIT (OUTPATIENT)
Dept: INTERNAL MEDICINE | Facility: CLINIC | Age: 21
End: 2021-04-12
Payer: COMMERCIAL

## 2021-04-12 DIAGNOSIS — M79.7 FIBROMYALGIA: Primary | ICD-10-CM

## 2021-04-12 PROCEDURE — 99214 OFFICE O/P EST MOD 30 MIN: CPT | Mod: GT | Performed by: INTERNAL MEDICINE

## 2021-04-12 RX ORDER — TRAMADOL HYDROCHLORIDE 50 MG/1
50 TABLET ORAL EVERY 6 HOURS PRN
Qty: 30 TABLET | Refills: 0 | Status: SHIPPED | OUTPATIENT
Start: 2021-04-12 | End: 2021-04-20

## 2021-04-12 NOTE — NURSING NOTE
Chief Complaint   Patient presents with     Recheck Medication     Kimberly Nissen, EMT at 1:34 PM on 4/12/2021    Video Visit Technology for this patient: Nancy not working, patient has smart device, please try Doximity Video with patient

## 2021-04-12 NOTE — PROGRESS NOTES
Cyndi is a very pleasant 21 year old who is being evaluated via a billable video visit.      Subjective   Cyndi presents for the following health issues: f/u a past medical history of Anxiety, Depression (01/101/2013), Depressive disorder, POTS (postural orthostatic tachycardia syndrome) (01/2012), and Uncomplicated asthma.       HPI     I met Cyndi in the clinic back in February 2021 to establish care.  We discussed anxiety, along with fatigue/body aches.  Lab work was all normal except for a very low vitamin D level.  She's been taking a high dose D3 supplement and feels somewhat better, but still endorses aches and pains from time to time.  We discussed the possibility of fibromyalgia.  I recommended exercises that emphasize stretching such as yoga, paula chi.  We discussed possible medication management and she prefers to take something prn as opposed to a daily medication.      In the meantime saw the NP clinic for a symptomatic hemorrhoid.  This has improved with a bowel regimen and sitz baths.  Still has an upcoming colorectal appointment to discuss other treatments.    She just turned 21 and would be due for her first pap smear.    Anxiety has improved since going back to work; didn't schedule with health psychology, has taken hydroxyzine rarely for symptoms.  It helps, but it makes her sleepy.    Review of Systems    ROS: 10 point ROS neg other than the symptoms noted above in the HPI.      Objective           Vitals:  No vitals were obtained today due to virtual visit.    Physical Exam   GENERAL: Healthy, alert and no distress  EYES: Eyes grossly normal to inspection.  No discharge or erythema, or obvious scleral/conjunctival abnormalities.  RESP: No audible wheeze, cough, or visible cyanosis.  No visible retractions or increased work of breathing.    SKIN: Visible skin clear. No significant rash, abnormal pigmentation or lesions.  NEURO: Cranial nerves grossly intact.  Mentation and speech appropriate for  age.  PSYCH: Mentation appears normal, affect normal/bright, judgement and insight intact, normal speech and appearance well-groomed.    Labs, imaging reviewed in Epic.     A/P Cyndi was seen today for f/u.  See discussion as above.      Fibromyalgia: would try tramadol prn, next step would be to start duloxetine daily  -     traMADol (ULTRAM) 50 MG tablet; Take 1 tablet (50 mg) by mouth every 6 hours as needed for severe pain    RTC in 3 months for in person visit, pap smear, and f/u symptoms    Nicolasa Henderson MD            Video-Visit Details    Type of service:  Video Visit started 1:55 ended 2:14 duration 19 minutes with another 10 minutes chart review, , and documentation same day    Originating Location (pt. Location): Home    Distant Location (provider location):  [facility] [place]     Platform used for Video Visit: [employer]

## 2021-04-13 ENCOUNTER — IMMUNIZATION (OUTPATIENT)
Dept: NURSING | Facility: CLINIC | Age: 21
End: 2021-04-13
Payer: COMMERCIAL

## 2021-04-13 PROCEDURE — 91300 PR COVID VAC PFIZER DIL RECON 30 MCG/0.3 ML IM: CPT

## 2021-04-13 PROCEDURE — 0001A PR COVID VAC PFIZER DIL RECON 30 MCG/0.3 ML IM: CPT

## 2021-04-18 ENCOUNTER — APPOINTMENT (OUTPATIENT)
Dept: GENERAL RADIOLOGY | Facility: CLINIC | Age: 21
End: 2021-04-18
Attending: EMERGENCY MEDICINE
Payer: COMMERCIAL

## 2021-04-18 ENCOUNTER — HOSPITAL ENCOUNTER (EMERGENCY)
Facility: CLINIC | Age: 21
Discharge: HOME | End: 2021-04-18
Attending: NURSE PRACTITIONER | Admitting: NURSE PRACTITIONER
Payer: COMMERCIAL

## 2021-04-18 VITALS
RESPIRATION RATE: 16 BRPM | WEIGHT: 145 LBS | HEIGHT: 70 IN | SYSTOLIC BLOOD PRESSURE: 125 MMHG | HEART RATE: 68 BPM | BODY MASS INDEX: 20.76 KG/M2 | DIASTOLIC BLOOD PRESSURE: 75 MMHG | TEMPERATURE: 97.7 F | OXYGEN SATURATION: 100 %

## 2021-04-18 DIAGNOSIS — R07.0 THROAT PAIN: ICD-10-CM

## 2021-04-18 DIAGNOSIS — K21.00 GASTROESOPHAGEAL REFLUX DISEASE WITH ESOPHAGITIS: ICD-10-CM

## 2021-04-18 PROCEDURE — 250N000013 HC RX MED GY IP 250 OP 250 PS 637: Performed by: NURSE PRACTITIONER

## 2021-04-18 PROCEDURE — 99283 EMERGENCY DEPT VISIT LOW MDM: CPT | Mod: 25

## 2021-04-18 PROCEDURE — 71046 X-RAY EXAM CHEST 2 VIEWS: CPT

## 2021-04-18 RX ORDER — SUCRALFATE ORAL 1 G/10ML
1 SUSPENSION ORAL 4 TIMES DAILY
Qty: 280 ML | Refills: 0 | Status: SHIPPED | OUTPATIENT
Start: 2021-04-18 | End: 2021-04-25

## 2021-04-18 RX ORDER — PANTOPRAZOLE SODIUM 40 MG/1
40 TABLET, DELAYED RELEASE ORAL DAILY
COMMUNITY
End: 2021-12-22

## 2021-04-18 RX ORDER — SUCRALFATE ORAL 1 G/10ML
1 SUSPENSION ORAL ONCE
Status: COMPLETED | OUTPATIENT
Start: 2021-04-18 | End: 2021-04-18

## 2021-04-18 RX ADMIN — SUCRALFATE 1 G: 1 SUSPENSION ORAL at 22:35

## 2021-04-18 ASSESSMENT — ENCOUNTER SYMPTOMS
TROUBLE SWALLOWING: 1
FEVER: 0
WHEEZING: 0
SHORTNESS OF BREATH: 0
STRIDOR: 0
CHILLS: 0
COUGH: 1

## 2021-04-18 ASSESSMENT — MIFFLIN-ST. JEOR: SCORE: 1502.97

## 2021-04-19 NOTE — DISCHARGE INSTRUCTIONS
Remember to sleep with your head of bed slightly elevated on a stack of pillows to approximately 30 degrees.  Small frequent meals are better than big ones.  Follow-up with your gastroenterologist as planned.

## 2021-04-19 NOTE — ED PROVIDER NOTES
"  History   Chief Complaint:  Aspiration       The history is provided by the patient.      Cyndi Grady is a 21 year old female with history of GERD who presents with trouble swallowing. The patient reports while eating Ramen noodles she felt like she was choking and was having difficultly swallowing the noodles, and felt like something was stuck in her throat. The noodles were warm but not hot. Sine then she has been coughing but still feels restricted. Her throat bothers her more when she lays down. She was able to drink tea. She has a history of GERD and takes omeprazole. She last had am upper endoscopy at [facility] 3-4 months ago. She does not smoke.    Review of Systems   Constitutional: Negative for chills and fever.   HENT: Positive for trouble swallowing.    Respiratory: Positive for cough (resolved). Negative for shortness of breath, wheezing and stridor.    All other systems reviewed and are negative.      Allergies:  Vitamin    Medications:  Atarax  Nasonex  Ultram  Pepcid  Protonix    Past Medical History:    Anxiety  Depression   POTS  Asthma  Dysmenorrhea  Herpes simplex  Acne vulgaris  Chronic daily headache     Past Surgical History:    Hernia Repair    Family History:    The patient denies past family history.     Social History:  The patient presents with her parent.  No tobacco use.    Physical Exam     Patient Vitals for the past 24 hrs:   BP Temp Temp src Pulse Resp SpO2 Height Weight   04/18/21 2126 125/75 97.7  F (36.5  C) Temporal 68 16 100 % 1.778 m (5' 10\") 65.8 kg (145 lb)       Physical Exam  General: Alert, Mild  discomfort, well kept  Eyes: PERRL, conjunctivae pink no scleral icterus or conjunctival injection  ENT:   Moist mucus membranes, posterior oropharynx clear without erythema or exudates, No lymphadenopathy, Normal voice  Resp:  Lungs clear to auscultation bilaterally, no crackles/rubs/wheezes. Good air movement. No stridor.  CV:  Normal rate and rhythm, no " "murmurs/rubs/gallops  GI:  Abdomen soft and non-distended.  Normoactive BS.  No tenderness, guarding or rebound, No masses  Skin:  Warm, dry.  No rashes or petechiae  Musculoskeletal: No peripheral edema or calf tenderness, Normal gross ROM   Neuro: Alert and oriented to person/place/time, normal sensation  Psychiatric: Normal affect, cooperative, good eye contact   Lymph: No lymphadenopathy      Emergency Department Course     Imaging:  XR, Chest, 2 Views  IMPRESSION: Negative chest.  Reading per radiology.      Emergency Department Course:    Reviewed:  I reviewed nursing notes, vitals and past medical history    Assessments:  2208 I obtained history and examined the patient as noted above.   2243 I rechecked the patient and explained findings.     Interventions:  2235 Carafate 1 g suspension PO     Disposition:  The patient was discharged to home.       Impression & Plan     Medical Decision Making:  Cyndi Grady is a 21 year old female who presents today for evaluation of throat discomfort and concern for aspiration.  She stated that due to her reflux disease she has had a hard time eating and felt that she \"swallowed a noodle down the wrong pipe.\"  She initially had episode of coughing and since that time has felt irritated in her throat therefore presented for evaluation.  Her examination shows no wheezing or adventitious breath sounds.  She felt improvement after Carafate.  Chest x-ray was obtained without indication for acute process.  We discussed signs and symptoms of aspiration and follow-up as well as return protocols.  She has been newly placed on pantoprazole.  I have given her prescription for Carafate.  We discussed sleeping with elevated head of bed as she has not been doing that as well.  Her symptoms also bother her most when lying down.  Most consistent with an exacerbation of her GERD and Zambrano's esophagitis.  She will otherwise follow-up with primary care provider and/or her GI " doctor.  She appears to be safe and appropriate for outpatient management follow-up and is discharged home.        Covid-19  Cyndi Grady was evaluated during a global COVID-19 pandemic, which necessitated consideration that the patient might be at risk for infection with the SARS-CoV-2 virus that causes COVID-19.   Applicable protocols for evaluation were followed during the patient's care.     Diagnosis:    ICD-10-CM    1. Throat pain  R07.0    2. Gastroesophageal reflux disease with esophagitis  K21.00        Discharge Medications:  New Prescriptions    SUCRALFATE (CARAFATE) 1 GM/10ML SUSPENSION    Take 10 mLs (1 g) by mouth 4 times daily for 7 days       Scribe Disclosure:  I, Rasheeda Gupta, am serving as a scribe at 10:05 PM on 4/18/2021 to document services personally performed by Lino Ling APRN CNP based on my observations and the provider's statements to me.         Lino Ling APRN CNP  04/18/21 [id number]

## 2021-04-19 NOTE — ED TRIAGE NOTES
Hx GERD and barrets esophagus, Swallowed Ramen noodles and felt like it went down wrong pipe.  Coughing since than but feels sl restricted.

## 2021-04-21 ENCOUNTER — OFFICE VISIT (OUTPATIENT)
Dept: SURGERY | Facility: CLINIC | Age: 21
End: 2021-04-21
Payer: COMMERCIAL

## 2021-04-21 VITALS
WEIGHT: 142.5 LBS | SYSTOLIC BLOOD PRESSURE: 124 MMHG | DIASTOLIC BLOOD PRESSURE: 77 MMHG | OXYGEN SATURATION: 100 % | TEMPERATURE: 98.1 F | BODY MASS INDEX: 20.4 KG/M2 | HEART RATE: 70 BPM | HEIGHT: 70 IN

## 2021-04-21 DIAGNOSIS — K59.09 OTHER CONSTIPATION: ICD-10-CM

## 2021-04-21 DIAGNOSIS — K60.2 ANAL FISSURE: Primary | ICD-10-CM

## 2021-04-21 PROCEDURE — 99213 OFFICE O/P EST LOW 20 MIN: CPT | Performed by: NURSE PRACTITIONER

## 2021-04-21 ASSESSMENT — MIFFLIN-ST. JEOR: SCORE: 1491.63

## 2021-04-21 ASSESSMENT — PAIN SCALES - GENERAL: PAINLEVEL: NO PAIN (0)

## 2021-04-21 NOTE — LETTER
"2021       RE: Cyndi Grady  [address]  [place] [facility] [address]     Dear Colleague,    Thank you for referring your patient, Cyndi Grady, to the [facility] [place] at [facility]. Please see a copy of my visit note below.    Colon and Rectal Surgery Consult Clinic Note    Date: 2021     Referring provider:  No referring provider defined for this encounter.     RE: Cyndi Grady  : 2000  EDOUARD: 2021    Cyndi Grady is a very pleasant 21 year old female who presents today for hemorrhoids.    HPI:  Cyndi reports that she has had hemorrhoids for the past 2-3 years. She has sharp pain with bowel movements and has seen some bright red blood on the toilet paper with wiping. She has struggled with constipation recently but is now taking Miralax, which has been helpful. She denies any prolapsing tissue. No anorectal surgeries. Denies anal receptive intercourse.     Physical Examination:  /77 (BP Location: Left arm, Patient Position: Sitting, Cuff Size: Adult Regular)   Pulse 70   Temp 98.1  F (36.7  C) (Oral)   Ht 5' 10\"   Wt 142 lb 8 oz   SpO2 100%   BMI 20.45 kg/m    General: alert, oriented, in no acute distress, sitting comfortably  HEENT: mucous membranes moist    Perianal external examination: Exam was chaperoned by Saba Castro MA   Perianal skin: Intact with no excoriation or lichenification.  Lesions: No evidence of an external lesion, nodularity, or induration in the perianal region.  Eversion of buttocks: There was evidence of an anal fissure. Details: posterior midline anal fissure opened up with eversion of buttocks.  Skin tags or external hemorrhoids: None.  Digital rectal examination: Was deferred in order not to cause further trauma    Anoscopy: Was deferred in order not to cause further trauma    Assessment/Plan: 21 year old female with " constipation and anal fissure. I discussed that this is likely the source of the pain and bleeding. Discussed the importance of keeping stools soft and easy to pass while healing fissure.  Recommended starting on a daily fiber supplement and can add in a laxative in addition as needed.  Will treat with topical diltiazem with follow up in 8 weeks. Discussed that it may take several weeks for symptoms to improve. If no improvement is noted after 4 weeks, return to clinic and discuss further intervention such as Botox injections.  Advised the use of Tylenol, ibuprofen, and warm tub baths for any pain. Patient's questions were answered to her stated satisfaction and she is in agreement with this plan.        Medical history:  Past Medical History:   Diagnosis Date     Anxiety      Depression 01/101/2013     Depressive disorder      POTS (postural orthostatic tachycardia syndrome) 01/2012     Uncomplicated asthma        Surgical history:  Past Surgical History:   Procedure Laterality Date     HERNIA REPAIR       HERNIA REPAIR N/A 01/2001    umbilical hernia       Problem list:  Patient Active Problem List    Diagnosis Date Noted     Dysmenorrhea 07/03/2019     Priority: Medium     Herpes simplex vulvovaginitis 03/20/2018     Priority: Medium     Acne vulgaris 03/20/2018     Priority: Medium     Peanut allergy 06/28/2017     Priority: Medium     Chronic daily headache 06/28/2017     Priority: Medium     Chronic nausea 06/28/2017     Priority: Medium     Egg allergy 06/28/2017     Priority: Medium     POTS (postural orthostatic tachycardia syndrome) 10/06/2016     Priority: Medium     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 Use for face, chest, arms, back. 226 g 2     budesonide-formoterol (SYMBICORT) 80-4.5 MCG/ACT Inhaler Inhale 2 puffs by mouth twice daily 11 g 0     clindamycin (CLEOCIN T) 1 % external lotion Apply topically daily 60 mL 11     diltiazem 2% in PLO gel Apply small amount to anal opening three times daily for 8 weeks. 60 g 0     EPINEPHrine (ADRENACLICK) 0.3 MG/0.3ML injection 2-pack Inject 0.3 mLs (0.3 mg) into the muscle as needed for anaphylaxis 0.6 mL 1     EPINEPHrine (EPIPEN/ADRENACLICK/OR ANY BX GENERIC EQUIV) 0.3 MG/0.3ML injection 2-pack Inject 0.3 mLs (0.3 mg) into the muscle as needed for anaphylaxis 0.6 mL 1     famotidine (PEPCID) 40 MG tablet        hydrOXYzine (ATARAX) 25 MG tablet Take 1 tablet (25 mg) by mouth 3 times daily as needed for anxiety 90 tablet 3     loratadine-pseudoePHEDrine (CLARITIN-D 24-HOUR)  MG 24 hr tablet Take 1 tablet by mouth daily Reported on 3/16/2017 14 tablet 11     mometasone (NASONEX) 50 MCG/ACT nasal spray Spray 2 sprays into both nostrils daily 17 g 3     pantoprazole (PROTONIX) 40 MG EC tablet Take 40 mg by mouth daily       phenylephrine-shark liver oil-mineral oil-petrolatum (PREPARATION H) 0.25-3-14-71.9 % rectal ointment Place rectally 2 times daily as needed for hemorrhoids 15 g 1     sucralfate (CARAFATE) 1 GM/10ML suspension Take 10 mLs (1 g) by mouth 4 times daily for 7 days 280 mL 0     tretinoin (RETIN-A) 0.025 % cream Spread a pea size amount into affected area topically at bedtime.  Use sunscreen SPF>20. 45 g 11     tretinoin (RETIN-A) 0.05 % external cream Apply topically At Bedtime 45 g 2     triamcinolone (KENALOG) 0.1 % external ointment Apply sparingly to affected area three times daily for 14 days.  Not to be used on the face. 15 g 3       Allergies:  Allergies   Allergen  "Reactions     Cats      Dogs      Egg [Chicken-Derived Products (Egg)]      Milk Protein Extract      Abdominal pain.     Nuts [Peanut-Derived] Hives and Swelling     Tree nuts-Cashew, pecans,walnuts.     Peanuts [Nuts] Hives and Swelling     Sesame Oil Hives and Swelling     Sesame seeds-not oil.       Family history:  Family History   Problem Relation Age of Onset     Breast Cancer Maternal Grandmother      Diabetes Maternal Grandmother      Melanoma No family hx of      Skin Cancer No family hx of        Social history:  Social History     Tobacco Use     Smoking status: Never Smoker     Smokeless tobacco: Never Used   Substance Use Topics     Alcohol use: No     Alcohol/week: 0.0 standard drinks    Marital status: single.  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    Nursing Notes:   Saba Manuel CMA  4/21/2021  2:08 PM  Signed  Chief Complaint   Patient presents with     New Patient     New consult for external hemorrhoids       Vitals:    04/21/21 1402   BP: 124/77   BP Location: Left arm   Patient Position: Sitting   Cuff Size: Adult Regular   Pulse: 70   Temp: 98.1  F (36.7  C)   TempSrc: Oral   SpO2: 100%   Weight: 142 lb 8 oz   Height: 5' 10\"       Body mass index is 20.45 kg/m .        Saba Farfan CMA       20 minutes spent on the date of the encounter doing chart review, history and exam, documentation and further activities as noted above.     CHEKO Cee, NP-C  Colon and Rectal Surgery   [facility]    This note was created using speech recognition software and may contain unintended word substitutions.        "

## 2021-04-21 NOTE — NURSING NOTE
"Chief Complaint   Patient presents with     New Patient     New consult for external hemorrhoids       Vitals:    04/21/21 1402   BP: 124/77   BP Location: Left arm   Patient Position: Sitting   Cuff Size: Adult Regular   Pulse: 70   Temp: 98.1  F (36.7  C)   TempSrc: Oral   SpO2: 100%   Weight: 142 lb 8 oz   Height: 5' 10\"       Body mass index is 20.45 kg/m .           Saba Farfan CMA    "

## 2021-04-21 NOTE — PROGRESS NOTES
"Colon and Rectal Surgery Consult Clinic Note    Date: 2021     Referring provider:  No referring provider defined for this encounter.     RE: Cyndi Grady  : 2000  EDOUARD: 2021    Cyndi Grady is a very pleasant 21 year old female who presents today for hemorrhoids.    HPI:  Cyndi reports that she has had hemorrhoids for the past 2-3 years. She has sharp pain with bowel movements and has seen some bright red blood on the toilet paper with wiping. She has struggled with constipation recently but is now taking Miralax, which has been helpful. She denies any prolapsing tissue. No anorectal surgeries. Denies anal receptive intercourse.     Physical Examination:  /77 (BP Location: Left arm, Patient Position: Sitting, Cuff Size: Adult Regular)   Pulse 70   Temp 98.1  F (36.7  C) (Oral)   Ht 5' 10\"   Wt 142 lb 8 oz   SpO2 100%   BMI 20.45 kg/m    General: alert, oriented, in no acute distress, sitting comfortably  HEENT: mucous membranes moist    Perianal external examination: Exam was chaperoned by Saba Castro MA   Perianal skin: Intact with no excoriation or lichenification.  Lesions: No evidence of an external lesion, nodularity, or induration in the perianal region.  Eversion of buttocks: There was evidence of an anal fissure. Details: posterior midline anal fissure opened up with eversion of buttocks.  Skin tags or external hemorrhoids: None.  Digital rectal examination: Was deferred in order not to cause further trauma    Anoscopy: Was deferred in order not to cause further trauma    Assessment/Plan: 21 year old female with constipation and anal fissure. I discussed that this is likely the source of the pain and bleeding. Discussed the importance of keeping stools soft and easy to pass while healing fissure.  Recommended starting on a daily fiber supplement and can add in a laxative in addition as needed.  Will treat with topical diltiazem with follow up in 8 " weeks. Discussed that it may take several weeks for symptoms to improve. If no improvement is noted after 4 weeks, return to clinic and discuss further intervention such as Botox injections.  Advised the use of Tylenol, ibuprofen, and warm tub baths for any pain. Patient's questions were answered to her stated satisfaction and she is in agreement with this plan.        Medical history:  Past Medical History:   Diagnosis Date     Anxiety      Depression 01/101/2013     Depressive disorder      POTS (postural orthostatic tachycardia syndrome) 01/2012     Uncomplicated asthma        Surgical history:  Past Surgical History:   Procedure Laterality Date     HERNIA REPAIR       HERNIA REPAIR N/A 01/2001    umbilical hernia       Problem list:  Patient Active Problem List    Diagnosis Date Noted     Dysmenorrhea 07/03/2019     Priority: Medium     Herpes simplex vulvovaginitis 03/20/2018     Priority: Medium     Acne vulgaris 03/20/2018     Priority: Medium     Peanut allergy 06/28/2017     Priority: Medium     Chronic daily headache 06/28/2017     Priority: Medium     Chronic nausea 06/28/2017     Priority: Medium     Egg allergy 06/28/2017     Priority: Medium     POTS (postural orthostatic tachycardia syndrome) 10/06/2016     Priority: Medium     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 Use for face, chest, arms, back. 226 g 2     budesonide-formoterol (SYMBICORT) 80-4.5 MCG/ACT Inhaler Inhale 2 puffs by mouth twice daily 11 g 0     clindamycin (CLEOCIN T)  1 % external lotion Apply topically daily 60 mL 11     diltiazem 2% in PLO gel Apply small amount to anal opening three times daily for 8 weeks. 60 g 0     EPINEPHrine (ADRENACLICK) 0.3 MG/0.3ML injection 2-pack Inject 0.3 mLs (0.3 mg) into the muscle as needed for anaphylaxis 0.6 mL 1     EPINEPHrine (EPIPEN/ADRENACLICK/OR ANY BX GENERIC EQUIV) 0.3 MG/0.3ML injection 2-pack Inject 0.3 mLs (0.3 mg) into the muscle as needed for anaphylaxis 0.6 mL 1     famotidine (PEPCID) 40 MG tablet        hydrOXYzine (ATARAX) 25 MG tablet Take 1 tablet (25 mg) by mouth 3 times daily as needed for anxiety 90 tablet 3     loratadine-pseudoePHEDrine (CLARITIN-D 24-HOUR)  MG 24 hr tablet Take 1 tablet by mouth daily Reported on 3/16/2017 14 tablet 11     mometasone (NASONEX) 50 MCG/ACT nasal spray Spray 2 sprays into both nostrils daily 17 g 3     pantoprazole (PROTONIX) 40 MG EC tablet Take 40 mg by mouth daily       phenylephrine-shark liver oil-mineral oil-petrolatum (PREPARATION H) 0.25-3-14-71.9 % rectal ointment Place rectally 2 times daily as needed for hemorrhoids 15 g 1     sucralfate (CARAFATE) 1 GM/10ML suspension Take 10 mLs (1 g) by mouth 4 times daily for 7 days 280 mL 0     tretinoin (RETIN-A) 0.025 % cream Spread a pea size amount into affected area topically at bedtime.  Use sunscreen SPF>20. 45 g 11     tretinoin (RETIN-A) 0.05 % external cream Apply topically At Bedtime 45 g 2     triamcinolone (KENALOG) 0.1 % external ointment Apply sparingly to affected area three times daily for 14 days.  Not to be used on the face. 15 g 3       Allergies:  Allergies   Allergen Reactions     Cats      Dogs      Egg [Chicken-Derived Products (Egg)]      Milk Protein Extract      Abdominal pain.     Nuts [Peanut-Derived] Hives and Swelling     Tree nuts-Cashew, pecans,walnuts.     Peanuts [Nuts] Hives and Swelling     Sesame Oil Hives and Swelling     Sesame seeds-not oil.       Family history:  Family History   Problem  "Relation Age of Onset     Breast Cancer Maternal Grandmother      Diabetes Maternal Grandmother      Melanoma No family hx of      Skin Cancer No family hx of        Social history:  Social History     Tobacco Use     Smoking status: Never Smoker     Smokeless tobacco: Never Used   Substance Use Topics     Alcohol use: No     Alcohol/week: 0.0 standard drinks    Marital status: single.  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    Nursing Notes:   Saba Manuel CMA  4/21/2021  2:08 PM  Signed  Chief Complaint   Patient presents with     New Patient     New consult for external hemorrhoids       Vitals:    04/21/21 1402   BP: 124/77   BP Location: Left arm   Patient Position: Sitting   Cuff Size: Adult Regular   Pulse: 70   Temp: 98.1  F (36.7  C)   TempSrc: Oral   SpO2: 100%   Weight: 142 lb 8 oz   Height: 5' 10\"       Body mass index is 20.45 kg/m .           Saba Farfan CMA         20 minutes spent on the date of the encounter doing chart review, history and exam, documentation and further activities as noted above.     CHEKO Cee, NP-C  Colon and Rectal Surgery   [facility]    This note was created using speech recognition software and may contain unintended word substitutions.    "

## 2021-05-04 ENCOUNTER — IMMUNIZATION (OUTPATIENT)
Dept: NURSING | Facility: CLINIC | Age: 21
End: 2021-05-04
Attending: INTERNAL MEDICINE
Payer: COMMERCIAL

## 2021-05-04 PROCEDURE — 0002A PR COVID VAC PFIZER DIL RECON 30 MCG/0.3 ML IM: CPT

## 2021-05-04 PROCEDURE — 91300 PR COVID VAC PFIZER DIL RECON 30 MCG/0.3 ML IM: CPT

## 2021-05-10 DIAGNOSIS — J45.30 MILD PERSISTENT ASTHMA WITHOUT COMPLICATION: ICD-10-CM

## 2021-05-10 RX ORDER — DILTIAZEM HYDROCHLORIDE 60 MG/1
TABLET, FILM COATED ORAL
Qty: 11 G | Refills: 0 | OUTPATIENT
Start: 2021-05-10

## 2021-05-10 NOTE — TELEPHONE ENCOUNTER
"Requested Prescriptions   Pending Prescriptions Disp Refills     SYMBICORT 80-4.5 MCG/ACT Inhaler [Pharmacy Med Name: Symbicort 80-4.5 MCG/ACT Inhalation Aerosol] 11 g 0     Sig: Inhale 2 puffs by mouth twice daily       Inhaled Steroids Protocol Failed - 5/10/2021 10:35 AM        Failed - Asthma control assessment score within normal limits in last 6 months     Please review ACT score.           Failed - Recent (6 mo) or future (30 days) visit within the authorizing provider's specialty     Patient had office visit in the last 6 months or has a visit in the next 30 days with authorizing provider or within the authorizing provider's specialty.  See \"Patient Info\" tab in inbasket, or \"Choose Columns\" in Meds & Orders section of the refill encounter.            Passed - Patient is age 12 or older        Passed - Medication is active on med list       Long-Acting Beta Agonist Inhalers Protocol  Failed - 5/10/2021 10:35 AM        Failed - Asthma control assessment score within normal limits in last 6 months     Please review ACT score.           Failed - Recent (6 mo) or future (30 days) visit within the authorizing provider's specialty     Patient had office visit in the last 6 months or has a visit in the next 30 days with authorizing provider or within the authorizing provider's specialty.  See \"Patient Info\" tab in inbasket, or \"Choose Columns\" in Meds & Orders section of the refill encounter.            Passed - Patient is age 12 or older        Passed - Medication is active on med list           Denied, not our patient Hope DANA Esparza    "

## 2021-08-04 ENCOUNTER — HOSPITAL ENCOUNTER (EMERGENCY)
Facility: CLINIC | Age: 21
Discharge: HOME | End: 2021-08-04
Attending: PHYSICIAN ASSISTANT | Admitting: PHYSICIAN ASSISTANT
Payer: COMMERCIAL

## 2021-08-04 VITALS
SYSTOLIC BLOOD PRESSURE: 107 MMHG | WEIGHT: 140 LBS | HEART RATE: 66 BPM | DIASTOLIC BLOOD PRESSURE: 64 MMHG | RESPIRATION RATE: 21 BRPM | OXYGEN SATURATION: 100 % | HEIGHT: 70 IN | TEMPERATURE: 98.7 F | BODY MASS INDEX: 20.04 KG/M2

## 2021-08-04 DIAGNOSIS — T78.40XA ALLERGIC REACTION, INITIAL ENCOUNTER: ICD-10-CM

## 2021-08-04 DIAGNOSIS — K14.6 SORENESS OF TONGUE: ICD-10-CM

## 2021-08-04 PROCEDURE — 96375 TX/PRO/DX INJ NEW DRUG ADDON: CPT

## 2021-08-04 PROCEDURE — 250N000009 HC RX 250: Performed by: PHYSICIAN ASSISTANT

## 2021-08-04 PROCEDURE — 96374 THER/PROPH/DIAG INJ IV PUSH: CPT

## 2021-08-04 PROCEDURE — 250N000011 HC RX IP 250 OP 636: Performed by: PHYSICIAN ASSISTANT

## 2021-08-04 PROCEDURE — 99284 EMERGENCY DEPT VISIT MOD MDM: CPT | Mod: 25

## 2021-08-04 RX ORDER — DIPHENHYDRAMINE HCL 25 MG
50 CAPSULE ORAL ONCE
Status: DISCONTINUED | OUTPATIENT
Start: 2021-08-04 | End: 2021-08-04

## 2021-08-04 RX ORDER — ONDANSETRON 2 MG/ML
4 INJECTION INTRAMUSCULAR; INTRAVENOUS ONCE
Status: COMPLETED | OUTPATIENT
Start: 2021-08-04 | End: 2021-08-04

## 2021-08-04 RX ORDER — DIPHENHYDRAMINE HYDROCHLORIDE 50 MG/ML
50 INJECTION INTRAMUSCULAR; INTRAVENOUS ONCE
Status: COMPLETED | OUTPATIENT
Start: 2021-08-04 | End: 2021-08-04

## 2021-08-04 RX ORDER — DEXAMETHASONE SODIUM PHOSPHATE 10 MG/ML
10 INJECTION, SOLUTION INTRAMUSCULAR; INTRAVENOUS ONCE
Status: COMPLETED | OUTPATIENT
Start: 2021-08-04 | End: 2021-08-04

## 2021-08-04 RX ORDER — FAMOTIDINE 20 MG/1
20 TABLET, FILM COATED ORAL ONCE
Status: DISCONTINUED | OUTPATIENT
Start: 2021-08-04 | End: 2021-08-04

## 2021-08-04 RX ORDER — EPINEPHRINE 0.3 MG/.3ML
0.3 INJECTION SUBCUTANEOUS ONCE
Status: DISCONTINUED | OUTPATIENT
Start: 2021-08-04 | End: 2021-08-04

## 2021-08-04 RX ORDER — EPINEPHRINE 0.3 MG/.3ML
0.3 INJECTION SUBCUTANEOUS
Qty: 2 EACH | Refills: 0 | Status: SHIPPED | OUTPATIENT
Start: 2021-08-04

## 2021-08-04 RX ORDER — ONDANSETRON 4 MG/1
4 TABLET, ORALLY DISINTEGRATING ORAL EVERY 6 HOURS PRN
Qty: 10 TABLET | Refills: 0 | Status: SHIPPED | OUTPATIENT
Start: 2021-08-04 | End: 2021-08-07

## 2021-08-04 RX ADMIN — EPINEPHRINE 0.3 MG: 1 INJECTION INTRAMUSCULAR; INTRAVENOUS; SUBCUTANEOUS at 22:07

## 2021-08-04 RX ADMIN — DEXAMETHASONE SODIUM PHOSPHATE 10 MG: 10 INJECTION, SOLUTION INTRAMUSCULAR; INTRAVENOUS at 21:53

## 2021-08-04 RX ADMIN — DIPHENHYDRAMINE HYDROCHLORIDE 50 MG: 50 INJECTION, SOLUTION INTRAMUSCULAR; INTRAVENOUS at 21:40

## 2021-08-04 RX ADMIN — FAMOTIDINE 20 MG: 10 INJECTION, SOLUTION INTRAVENOUS at 21:39

## 2021-08-04 RX ADMIN — ONDANSETRON 4 MG: 2 INJECTION INTRAMUSCULAR; INTRAVENOUS at 23:13

## 2021-08-04 ASSESSMENT — ENCOUNTER SYMPTOMS
ABDOMINAL PAIN: 1
SORE THROAT: 1
WHEEZING: 1

## 2021-08-04 ASSESSMENT — MIFFLIN-ST. JEOR: SCORE: 1480.29

## 2021-08-05 NOTE — ED PROVIDER NOTES
Rapid Assessment Note    History:   Cyndi Grady is a 21 year old female who presents with an allergic reactions. Cyndi reports an irritated tongue, itchy throat, wheezing, and abdominal pain beginning 1.5 hours ago. Symptoms have been worsening slowly since onset. She has had an allergic reaction to peanuts before and this feels similar, but she does not think she ate any peanuts tonight. No new medications or exposures.  She has an Epipen at home but did not use this. No rash.     Exam:   General: Awake, alert, pleasant, non-toxic.  Head:  Scalp is NC/AT  Eyes:  Conjunctiva normal, PERRL  ENT:  The external nose and ears are normal.     Oropharynx clear.  No swelling of lips, tongue, floor of mouth, uvula, posterior oropharynx.  There is a small area of skin missing from the side of the tongue consistent with likely superficial burn.  No obvious lesions noted.    Neck:  Normal range of motion without rigidity.  CV:  Regular rate and rhythm    No pathologic murmur, rubs, or gallops.  Resp:  Breath sounds are clear bilaterally.  No crackles, wheezes, rhonchi, stridor.    Non-labored, no retractions or accessory muscle use  Abdomen: Abdomen is soft, no distension, no tenderness, no masses. No CVA tenderness.  MS:  No lower extremity edema or asymmetric calf swelling. Normal ROM in all joints without effusions.    No midline cervical, thoracic, or lumbar tenderness  Skin:  Warm and dry, No rash or lesions noted. 2+ peripheral pulses in all extremities  Neuro: Alert and oriented x3.  No gross motor deficits.  No facial asymmetry.  Psych: Awake. Alert. Normal affect. Appropriate interactions.      Plan of Care: Possible allergic reaction.  Given history of prior anaphylactic reactions and reports of worsening symptoms with throat swelling and wheezing reasonable to treat for anaphylaxis.  Objectively examination appears unremarkable with no objective swelling or wheezing, maintaining airway.  I evaluated the  patient and developed an initial plan of care. I discussed this plan and explained that I, or one of my partners, would be returning to complete the evaluation.     I, Luciana Gavin, am serving as a scribe to document services personally performed by Rohan Irving PA-C, based on my observations and the provider's statements to me.    08/04/2021  [facility]    Portions of this medical record were completed by a scribe. UPON MY REVIEW AND AUTHENTICATION BY ELECTRONIC SIGNATURE, this confirms (a) I performed the applicable clinical services, and (b) the record is accurate.        Rohan Irving PA-C  08/04/21 0656

## 2021-08-05 NOTE — ED TRIAGE NOTES
States nut allergy, had onset abd pain with a sore on her tongue and throat itchy, states feels like onset asthma . Has not used epi pen. Noted reddened area R side of tongue

## 2021-08-05 NOTE — ED PROVIDER NOTES
"  History   Chief Complaint:  Allergic Reaction       The history is provided by the patient.      Cyndi Grady is a 21 year old female who presents with an allergic reaction. Cyndi reports an irritated tongue, itchy throat, wheezing, and abdominal pain beginning 1.5 hours ago. Symptoms have been worsening slowly since onset. She has had an allergic reaction to peanuts before and this feels similar, but she does not think she ate any peanuts tonight. No new medications or exposures.  She has an Epipen at home but did not use this. No rash.    Review of Systems   HENT: Positive for sore throat.    Respiratory: Positive for wheezing.    Gastrointestinal: Positive for abdominal pain.   Skin: Negative for rash.   All other systems reviewed and are negative.      Allergies:  Cats  Dairy Products [Milk Protein Extract]  Dogs  Egg [Chicken-Derived Products (Egg)]  Nuts [Peanut-Derived]  Peanuts [Nuts]  Sesame Oil    Medications:  Albuterol inhaler  Budesonide-formoterol inhaler  Epinephrine    Hydroxyzine    Mometasone   Pantoprazole     Past Medical History:    Anxiety  Depression  GERD  POTS  Uncomplicated asthma     Past Surgical History:    Hernia repair, umbilical     Family History:    Breast cancer  Diabetes    Social History:  Presents with family  PCP: Nicolasa Blackman     Physical Exam     Patient Vitals for the past 24 hrs:   BP Temp Temp src Pulse Resp SpO2 Height Weight   08/04/21 2230 -- -- -- 66 21 100 % -- --   08/04/21 2215 107/64 -- -- 69 22 100 % -- --   08/04/21 2114 115/63 98.7  F (37.1  C) Oral -- -- -- 1.778 m (5' 10\") 63.5 kg (140 lb)   08/04/21 2112 -- -- -- 63 18 100 % -- --       Physical Exam  General:          Awake, alert, pleasant, non-toxic.  Head:              Scalp is NC/AT  Eyes:               Conjunctiva normal, PERRL  ENT:                The external nose and ears are normal.                           Oropharynx clear.  No swelling of lips, tongue, floor of mouth, " uvula, posterior oropharynx.  There is a small area of skin missing from the side of the tongue consistent with likely superficial burn.  No obvious lesions noted.    Neck:              Normal range of motion without rigidity.  CV:                  Regular rate and rhythm                          No pathologic murmur, rubs, or gallops.  Resp:              Breath sounds are clear bilaterally.  No crackles, wheezes, rhonchi, stridor.                          Non-labored, no retractions or accessory muscle use  Abdomen:      Abdomen is soft, no distension, no tenderness, no masses. No CVA tenderness.  MS:                  No lower extremity edema or asymmetric calf swelling. Normal ROM in all joints without effusions.                          No midline cervical, thoracic, or lumbar tenderness  Skin:               Warm and dry, No rash or lesions noted. 2+ peripheral pulses in all extremities  Neuro: Alert and oriented x3.  No gross motor deficits.  No facial asymmetry.  Psych:            Awake. Alert. Normal affect. Appropriate interactions.    Emergency Department Course     Emergency Department Course:    Reviewed:  I reviewed nursing notes, vitals, past medical history and care everywhere    Assessments:  2025 I obtained history and examined the patient as noted above.   2250 I rechecked the patient. She feels improved.     Interventions:  2139 Pepcid, 20 mg, IV  2140 Benadryl, 50 mg, IV  2153 Decadron, 10 mg, IV  2207 Epinephrine, 0.3 mg, IM   Zofran, 4 mg, IV    Disposition:  The patient was discharged to home.       Impression & Plan     Medical Decision Making:  The patient presents for signs and symptoms consistent with an allergic reaction.  Based on the severity of presentation, epinephrine was indicated.  Following the administration of epinephrine as well as the above listed medication, the patient was monitored in the emergency department to assure no rebound symptoms.  At the time of discharge, the  patient does not have signs of airway compromise and is hemodynamically stable.  Instructions for the use of benadryl and/or zyrtec, H2 blocker, and prednisone were reviewed.  Return precautions including oral swelling, difficulties breathing, chest pain, syncope were given.  Patient will follow up in approximately 1 week for failure to improve. Epi pen given.         Covid-19  Cyndi Grady was evaluated during a global COVID-19 pandemic, which necessitated consideration that the patient might be at risk for infection with the SARS-CoV-2 virus that causes COVID-19.   Applicable protocols for evaluation were followed during the patient's care.   COVID-19 was considered as part of the patient's evaluation.    Diagnosis:    ICD-10-CM    1. Allergic reaction, initial encounter  T78.40XA    2. Soreness of tongue  K14.6        Discharge Medications:  Discharge Medication List as of 8/4/2021 11:09 PM      START taking these medications    Details   !! EPINEPHrine (ANY BX GENERIC EQUIV) 0.3 MG/0.3ML injection 2-pack Inject 0.3 mLs (0.3 mg) into the muscle once as needed for anaphylaxis, Disp-2 each, R-0, E-Prescribe      ondansetron (ZOFRAN ODT) 4 MG ODT tab Take 1 tablet (4 mg) by mouth every 6 hours as needed for nausea or vomiting, Disp-10 tablet, R-0, E-Prescribe       !! - Potential duplicate medications found. Please discuss with provider.          Scribe Disclosure:  I, Luciana Gavin, am serving as a scribe at 10:49 PM on 8/4/2021 to personally document services performed by Rohan Irving PA-C based on my observations and the provider's statements to me.           Rohan Irving PA-C  08/05/21 0104

## 2021-08-16 ENCOUNTER — OFFICE VISIT (OUTPATIENT)
Dept: FAMILY MEDICINE | Facility: CLINIC | Age: 21
End: 2021-08-16
Payer: COMMERCIAL

## 2021-08-16 VITALS
OXYGEN SATURATION: 98 % | TEMPERATURE: 97.9 F | DIASTOLIC BLOOD PRESSURE: 76 MMHG | WEIGHT: 140 LBS | HEIGHT: 70 IN | HEART RATE: 62 BPM | BODY MASS INDEX: 20.04 KG/M2 | SYSTOLIC BLOOD PRESSURE: 113 MMHG

## 2021-08-16 DIAGNOSIS — J35.8 TONSIL STONE: ICD-10-CM

## 2021-08-16 DIAGNOSIS — T78.40XD ALLERGIC REACTION, SUBSEQUENT ENCOUNTER: ICD-10-CM

## 2021-08-16 DIAGNOSIS — G89.29 OTHER CHRONIC PAIN: Primary | ICD-10-CM

## 2021-08-16 PROCEDURE — 99213 OFFICE O/P EST LOW 20 MIN: CPT | Performed by: NURSE PRACTITIONER

## 2021-08-16 ASSESSMENT — MIFFLIN-ST. JEOR: SCORE: 1480.29

## 2021-08-16 ASSESSMENT — PAIN SCALES - GENERAL: PAINLEVEL: NO PAIN (0)

## 2021-08-16 ASSESSMENT — ENCOUNTER SYMPTOMS
FATIGUE: 0
ARTHRALGIAS: 1
CHILLS: 0
FEVER: 0
BACK PAIN: 1

## 2021-08-16 NOTE — PROGRESS NOTES
"       HPI       Cyndi Grady is a 21 year old woman who presents with multiple concerns.   Chief Complaint   Patient presents with     ER F/U     ER follow up.   Other Chronic Pain: Cyndi reports that she has generalized chronic pain in her hips, joints and back since 2012. She would like to see a specialist at the [facility]. She is requesting a referral. No recent spine, hip x rays.   Tonsil stones: Recurring tonsil stones, nothing currently. Also has intermittent throat pain. Would like to see ENT for their opinion on this.   ER visit 08/04/2021-Allergic reaction-   \"Cyndi Grady is a 21 year old female who presents with an allergic reactions. Cyndi reports an irritated tongue, itchy throat, wheezing, and abdominal pain beginning 1.5 hours ago. Symptoms have been worsening slowly since onset. She has had an allergic reaction to peanuts before and this feels similar, but she does not think she ate any peanuts tonight. No new medications or exposures.  She has an Epipen at home but did not use this. No rash.   Plan of Care: Possible allergic reaction.  Given history of prior anaphylactic reactions and reports of worsening symptoms with throat swelling and wheezing reasonable to treat for anaphylaxis.  Objectively examination appears unremarkable with no objective swelling or wheezing, maintaining airway.I evaluated the patient and developed an initial plan of care. I discussed this plan and explained that I, or one of my partners, would be returning to complete the evaluation.\"    Currently, no s/s allergic reaction.        Problem, Medication and Allergy Lists were reviewed and updated if needed.    Patient is an established patient of this clinic.         Review of Systems:   Review of Systems     Constitutional:  Negative for fever, chills and fatigue.   Musculoskeletal:  Positive for back pain and arthralgias.          ENT refer     Physical Exam:     Vitals:    08/16/21 1119   BP: 113/76 " "  Pulse: 62   Temp: 97.9  F (36.6  C)   TempSrc: Oral   SpO2: 98%   Weight: 63.5 kg (140 lb)   Height: 1.778 m (5' 10\")     Body mass index is 20.09 kg/m .  Vitals were reviewed and were normal.     Physical Exam  Vitals reviewed.   Constitutional:       Appearance: Normal appearance.   HENT:      Head: Normocephalic.   Cardiovascular:      Rate and Rhythm: Normal rate and regular rhythm.      Pulses: Normal pulses.      Heart sounds: Normal heart sounds.   Pulmonary:      Effort: Pulmonary effort is normal.      Breath sounds: Normal breath sounds.   Musculoskeletal:         General: Normal range of motion.   Skin:     General: Skin is warm and dry.   Neurological:      General: No focal deficit present.      Mental Status: She is alert and oriented to person, place, and time.   Psychiatric:         Mood and Affect: Mood normal.         Behavior: Behavior normal.           Results:   No current diagnostics ordered.   Assessment and Plan     1. Other chronic pain      2. Tonsil stone      3. Allergic reaction, subsequent encounter    Patient instructions: Referral sent to [facility] for patient reported chronic pain since 2012. ENT referral for opinion of recurring tonsil tones, intermittent throat pain. Carry Epi pen for potential allergic reactions. Options for treatment and follow-up care were reviewed with the patient. Cyndi Grady  engaged in the decision making process and verbalized understanding of the options discussed and agreed with the final plan.  CHEKO Moseley, CNP            "

## 2021-08-16 NOTE — PATIENT INSTRUCTIONS
[facility] Medication Refill Request Information:  * Please contact your pharmacy regarding ANY request for medication refills.  ** NP Clinic Prescription Fax = [phone]  * Please allow 3 business days for routine medication refills.  * Please allow 5 business days for controlled substance medication refills.     [facility] Test Result notification information:  *You will be notified with in 7-10 days of your appointment day regarding the results of your test.  If you are on MyChart you will be notified as soon as the provider has reviewed the results and signed off on them.    [facility]: [phone]     If you have questions regarding Covid-19 and the Covid-19 vaccine, please visit this website.    [email or web address]

## 2021-08-16 NOTE — NURSING NOTE
"21 year old  Chief Complaint   Patient presents with     ER F/U     ER follow up.       Blood pressure 113/76, pulse 62, temperature 97.9  F (36.6  C), temperature source Oral, height 1.778 m (5' 10\"), weight 63.5 kg (140 lb), SpO2 98 %, not currently breastfeeding. Body mass index is 20.09 kg/m .  BP completed using cuff size:      Leonarda Feliz, CASTRO  August 16, 2021 11:24 AM  "

## 2021-08-17 NOTE — TELEPHONE ENCOUNTER
FUTURE VISIT INFORMATION      FUTURE VISIT INFORMATION:    Date: 9/9/21    Time: 11:15 AM    Location: [facility] [place]  REFERRAL INFORMATION:    Referring provider:  Lexie Chung NP    Referring providers clinic:  [facility] - Primary Care    Reason for visit/diagnosis: Tonsil Stones    RECORDS REQUESTED FROM:       Clinic name Comments Records Status Imaging Status   [facility] 8/16/21 - [facility] OV with Lexie Chung NP [facility] 8/4/21 - ED OV with ALFONSO Avery  4/18/21 - ED OV with Lino Ling NP [facility] 9/22/20 - PED OV with Dr. Zuniga [facility] 2/18/20 - [facility] OV with Dr. Montesinos [facility] Everywhere

## 2021-09-09 ENCOUNTER — PRE VISIT (OUTPATIENT)
Dept: OTOLARYNGOLOGY | Facility: CLINIC | Age: 21
End: 2021-09-09

## 2021-09-09 ENCOUNTER — OFFICE VISIT (OUTPATIENT)
Dept: OTOLARYNGOLOGY | Facility: CLINIC | Age: 21
End: 2021-09-09
Attending: NURSE PRACTITIONER
Payer: COMMERCIAL

## 2021-09-09 VITALS — HEIGHT: 70 IN | WEIGHT: 130.73 LBS | BODY MASS INDEX: 18.72 KG/M2

## 2021-09-09 DIAGNOSIS — J35.8 TONSIL STONE: ICD-10-CM

## 2021-09-09 DIAGNOSIS — J35.01 CHRONIC TONSILLITIS: Primary | ICD-10-CM

## 2021-09-09 PROCEDURE — 99203 OFFICE O/P NEW LOW 30 MIN: CPT | Performed by: OTOLARYNGOLOGY

## 2021-09-09 ASSESSMENT — MIFFLIN-ST. JEOR: SCORE: 1438.25

## 2021-09-09 ASSESSMENT — PAIN SCALES - GENERAL: PAINLEVEL: NO PAIN (0)

## 2021-09-09 NOTE — LETTER
2021       RE: Cyndi Grady  [address]  [place] [facility] [address]     Dear Colleague,    Thank you for referring your patient, Cyndi Grady, to the [facility] [place] [facility]. Please see a copy of my visit note below.    Otolaryngology Adult Consultation    Patient: Cyndi Grady  : 2000    HPI:  Cyndi Grady is a 21 year old female seen today in the Otolaryngology Clinic for recurrent tonsil stones and chronic tonsillitis.  Patient reports that she has been having recurrent tonsil stones for about 10 years.  She does feel like they have gotten worse in the last 2 to 3 years.  They feel like they have become more frequent and stones are getting larger and more irritative.  She does do some self cleaning with Q-tips to express them.  They do cause halitosis and smell quite bad.    Medications:  Current Outpatient Medications   Medication     albuterol (PROAIR HFA/PROVENTIL HFA/VENTOLIN HFA) 108 (90 Base) MCG/ACT inhaler     benzoyl peroxide 5 % external liquid     budesonide-formoterol (SYMBICORT) 80-4.5 MCG/ACT Inhaler     clindamycin (CLEOCIN T) 1 % external lotion     diltiazem 2% in PLO gel     EPINEPHrine (ADRENACLICK) 0.3 MG/0.3ML injection 2-pack     EPINEPHrine (ANY BX GENERIC EQUIV) 0.3 MG/0.3ML injection 2-pack     EPINEPHrine (EPIPEN/ADRENACLICK/OR ANY BX GENERIC EQUIV) 0.3 MG/0.3ML injection 2-pack     famotidine (PEPCID) 40 MG tablet     hydrOXYzine (ATARAX) 25 MG tablet     loratadine-pseudoePHEDrine (CLARITIN-D 24-HOUR)  MG 24 hr tablet     mometasone (NASONEX) 50 MCG/ACT nasal spray     pantoprazole (PROTONIX) 40 MG EC tablet     phenylephrine-shark liver oil-mineral oil-petrolatum (PREPARATION H) 0.25-3-14-71.9 % rectal ointment     tretinoin (RETIN-A) 0.025 % cream     tretinoin (RETIN-A) 0.05 % external cream     triamcinolone (KENALOG) 0.1 % external  ointment     No current facility-administered medications for this visit.       Allergies: Cats, Dairy products [milk protein extract], Dogs, Egg [chicken-derived products (egg)], Nuts [peanut-derived], Peanuts [nuts], and Sesame oil     PMH:  Past Medical History:   Diagnosis Date     Anxiety      Depression 01/101/2013     Depressive disorder      POTS (postural orthostatic tachycardia syndrome) 01/2012     Uncomplicated asthma        PSH:  Past Surgical History:   Procedure Laterality Date     HERNIA REPAIR       HERNIA REPAIR N/A 01/2001    umbilical hernia       FH:  Family History   Problem Relation Age of Onset     Breast Cancer Maternal Grandmother      Diabetes Maternal Grandmother      Melanoma No family hx of      Skin Cancer No family hx of         SH:  Social History     Tobacco Use     Smoking status: Never Smoker     Smokeless tobacco: Never Used   Substance Use Topics     Alcohol use: No     Alcohol/week: 0.0 standard drinks     Drug use: Yes     Types: Marijuana         Physical Exam:    GEN:  The patient is alert, oriented and in no acute distress.  ORAL:  Oral cavity shows healthy mucosa with out ulceration, masses or other lesions                involving the tongue, palate, buccal mucosa, floor of mouth or gingiva.  Tonsils are about 2 and half plus.  They are cryptic bilaterally.                  Assessment/Plan: Patient presents with recurrent tonsil stones.  I discussed with her options for management which include observation versus tonsillectomy.  We discussed pros and cons of each option.  I did discuss with her what is involved in a tonsillectomy as well as recovery. Risks of surgery were discussed with the patient ,which include sever pain, bleeding 7-10 days after surgery (~5% risk), tongue numbness, tongue swelling, taste change, VPI, and nasopharyngeal stenosis. Expected post-operative recovery was also discussed and includes soft diet, pain control, and time off of work (7-14  days).    I spent a total of 30 minutes total time towards today's visit.  Time spent included seeing and evaluating Cyndi Grady during today's office visit, which included counseling the patient.  Time was also spent reviewing records/tests; and documenting clinical information in the electronic health record.         Again, thank you for allowing me to participate in the care of your patient.      Sincerely,    Jenelle Long MD

## 2021-09-09 NOTE — PATIENT INSTRUCTIONS
1. You were seen in the clinic today by Dr. Long. Today you discussed tonsillectomy indications, procedure, follow up, and expectations.     2.   You have decided to defer the tonsillectomy at this time but should you have any questions or concerns or should you elect to proceed with surgery, please give our clinic a call at one of the below numbers and we will proceed with getting you scheduled.      Debby Gomez, LPN  [phone]    Racquel Mathur, RNCC  [phone]    [facility]  Department of Otolaryngology

## 2021-09-09 NOTE — PROGRESS NOTES
Otolaryngology Adult Consultation    Patient: Cyndi Grady  : 2000    HPI:  Cyndi Grady is a 21 year old female seen today in the Otolaryngology Clinic for recurrent tonsil stones and chronic tonsillitis.  Patient reports that she has been having recurrent tonsil stones for about 10 years.  She does feel like they have gotten worse in the last 2 to 3 years.  They feel like they have become more frequent and stones are getting larger and more irritative.  She does do some self cleaning with Q-tips to express them.  They do cause halitosis and smell quite bad.    Medications:  Current Outpatient Medications   Medication     albuterol (PROAIR HFA/PROVENTIL HFA/VENTOLIN HFA) 108 (90 Base) MCG/ACT inhaler     benzoyl peroxide 5 % external liquid     budesonide-formoterol (SYMBICORT) 80-4.5 MCG/ACT Inhaler     clindamycin (CLEOCIN T) 1 % external lotion     diltiazem 2% in PLO gel     EPINEPHrine (ADRENACLICK) 0.3 MG/0.3ML injection 2-pack     EPINEPHrine (ANY BX GENERIC EQUIV) 0.3 MG/0.3ML injection 2-pack     EPINEPHrine (EPIPEN/ADRENACLICK/OR ANY BX GENERIC EQUIV) 0.3 MG/0.3ML injection 2-pack     famotidine (PEPCID) 40 MG tablet     hydrOXYzine (ATARAX) 25 MG tablet     loratadine-pseudoePHEDrine (CLARITIN-D 24-HOUR)  MG 24 hr tablet     mometasone (NASONEX) 50 MCG/ACT nasal spray     pantoprazole (PROTONIX) 40 MG EC tablet     phenylephrine-shark liver oil-mineral oil-petrolatum (PREPARATION H) 0.25-3-14-71.9 % rectal ointment     tretinoin (RETIN-A) 0.025 % cream     tretinoin (RETIN-A) 0.05 % external cream     triamcinolone (KENALOG) 0.1 % external ointment     No current facility-administered medications for this visit.       Allergies: Cats, Dairy products [milk protein extract], Dogs, Egg [chicken-derived products (egg)], Nuts [peanut-derived], Peanuts [nuts], and Sesame oil     PMH:  Past Medical History:   Diagnosis Date     Anxiety      Depression       Depressive disorder      POTS (postural orthostatic tachycardia syndrome) 01/2012     Uncomplicated asthma        PSH:  Past Surgical History:   Procedure Laterality Date     HERNIA REPAIR       HERNIA REPAIR N/A 01/2001    umbilical hernia       FH:  Family History   Problem Relation Age of Onset     Breast Cancer Maternal Grandmother      Diabetes Maternal Grandmother      Melanoma No family hx of      Skin Cancer No family hx of         SH:  Social History     Tobacco Use     Smoking status: Never Smoker     Smokeless tobacco: Never Used   Substance Use Topics     Alcohol use: No     Alcohol/week: 0.0 standard drinks     Drug use: Yes     Types: Marijuana         Physical Exam:    GEN:  The patient is alert, oriented and in no acute distress.  ORAL:  Oral cavity shows healthy mucosa with out ulceration, masses or other lesions                involving the tongue, palate, buccal mucosa, floor of mouth or gingiva.  Tonsils are about 2 and half plus.  They are cryptic bilaterally.                  Assessment/Plan: Patient presents with recurrent tonsil stones.  I discussed with her options for management which include observation versus tonsillectomy.  We discussed pros and cons of each option.  I did discuss with her what is involved in a tonsillectomy as well as recovery. Risks of surgery were discussed with the patient ,which include sever pain, bleeding 7-10 days after surgery (~5% risk), tongue numbness, tongue swelling, taste change, VPI, and nasopharyngeal stenosis. Expected post-operative recovery was also discussed and includes soft diet, pain control, and time off of work (7-14 days).    I spent a total of 30 minutes total time towards today's visit.  Time spent included seeing and evaluating Cyndi Grady during today's office visit, which included counseling the patient.  Time was also spent reviewing records/tests; and documenting clinical information in the electronic health record.

## 2021-09-18 ENCOUNTER — HEALTH MAINTENANCE LETTER (OUTPATIENT)
Age: 21
End: 2021-09-18

## 2021-12-22 ENCOUNTER — OFFICE VISIT (OUTPATIENT)
Dept: FAMILY MEDICINE | Facility: CLINIC | Age: 21
End: 2021-12-22
Payer: COMMERCIAL

## 2021-12-22 ENCOUNTER — NURSE TRIAGE (OUTPATIENT)
Dept: NURSING | Facility: CLINIC | Age: 21
End: 2021-12-22
Payer: COMMERCIAL

## 2021-12-22 VITALS
WEIGHT: 130 LBS | TEMPERATURE: 98.4 F | RESPIRATION RATE: 20 BRPM | HEART RATE: 82 BPM | SYSTOLIC BLOOD PRESSURE: 113 MMHG | OXYGEN SATURATION: 98 % | DIASTOLIC BLOOD PRESSURE: 75 MMHG | BODY MASS INDEX: 18.65 KG/M2

## 2021-12-22 DIAGNOSIS — J02.9 SORE THROAT: Primary | ICD-10-CM

## 2021-12-22 DIAGNOSIS — R05.9 COUGH: ICD-10-CM

## 2021-12-22 DIAGNOSIS — R09.81 NASAL CONGESTION: ICD-10-CM

## 2021-12-22 PROCEDURE — 99214 OFFICE O/P EST MOD 30 MIN: CPT | Performed by: NURSE PRACTITIONER

## 2021-12-22 PROCEDURE — U0005 INFEC AGEN DETEC AMPLI PROBE: HCPCS | Performed by: NURSE PRACTITIONER

## 2021-12-22 PROCEDURE — U0003 INFECTIOUS AGENT DETECTION BY NUCLEIC ACID (DNA OR RNA); SEVERE ACUTE RESPIRATORY SYNDROME CORONAVIRUS 2 (SARS-COV-2) (CORONAVIRUS DISEASE [COVID-19]), AMPLIFIED PROBE TECHNIQUE, MAKING USE OF HIGH THROUGHPUT TECHNOLOGIES AS DESCRIBED BY CMS-2020-01-R: HCPCS | Performed by: NURSE PRACTITIONER

## 2021-12-22 NOTE — TELEPHONE ENCOUNTER
Had a sore throat and congestion one week ago. Had difficulty speaking, laryngitis. Also has asthma. Coughing a lot. Voice not getting better. She will contact her clinic when they open, for an appointment. I advised urgent care if they can't get her into the clinic.  Joanna Linn RN  [facility]      Reason for Disposition    Mild hoarseness    Wheezing is present    Additional Information    Negative: Severe difficulty breathing (e.g., struggling for each breath, speaks in single words)    Negative: Bluish (or gray) lips or face now    Negative: [1] Stridor AND [2] difficulty breathing    Negative: Started suddenly after sting from bee, wasp, or yellow jacket    Negative: Started suddenly after taking a medicine or allergic food (e.g., nuts, seafood)    Negative: Started suddenly along with widespread hives    Negative: Can't swallow normal secretions (e.g., drooling or spitting)    Negative: Tongue or facial swelling    Negative: Sounds like a life-threatening emergency to the triager    Negative: [1] Nasal allergies also present AND [2] they are acting up    Negative: Cold symptoms are main concern    Negative: Sore throat is main symptom    Negative: [1] Resolved choking episode AND [2] hoarseness lasts > 30 minutes    Negative: Direct blow to front of neck    Negative: Difficulty breathing    Negative: [1] Hoarseness starting in past 24 hours AND [2] taking an ACE Inhibitor medication (e.g., benazepril/LOTENSIN, captopril/CAPOTEN, enalapril/VASOTEC, lisinopril/ZESTRIL)    Negative: Patient sounds very sick or weak to the triager    Negative: Fever > 103 F (39.4 C)    Negative: Fever present > 3 days (72 hours)    Negative: Severe sore throat pain     Pain at 3    Negative: [1] Hoarseness starting > 24 hours ago AND [2] taking an ACE Inhibitor medication (e.g., benazepril/LOTENSIN, captopril/CAPOTEN, enalapril/VASOTEC, lisinopril/ZESTRIL)    Negative: Hoarseness persists > 2 weeks    Negative:  Hoarseness is a chronic symptom (recurrent or ongoing AND present > 4 weeks)    Negative: Severe difficulty breathing (e.g., struggling for each breath, speaks in single words)    Negative: Bluish (or gray) lips or face now    Negative: [1] Difficulty breathing AND [2] exposure to flames, smoke, or fumes    Negative: [1] Stridor AND [2] difficulty breathing    Negative: Sounds like a life-threatening emergency to the triager    Negative: [1] Previous asthma attacks AND [2] this feels like asthma attack    Negative: Dry (non-productive) cough (i.e., no sputum or minimal clear sputum)    Negative: Chest pain  (Exception: MILD central chest pain, present only when coughing)    Negative: Difficulty breathing    Negative: Patient sounds very sick or weak to the triager    Negative: [1] Coughed up blood AND [2] > 1 tablespoon (15 ml) (Exception: blood-tinged sputum)    Negative: Fever > 103 F (39.4 C)    Negative: [1] Fever > 101 F (38.3 C) AND [2] age > 60    Negative: [1] Fever > 100.0 F (37.8 C) AND [2] bedridden (e.g., nursing home patient, CVA, chronic illness, recovering from surgery)    Negative: [1] Fever > 100.0 F (37.8 C) AND [2] diabetes mellitus or weak immune system (e.g., HIV positive, cancer chemo, splenectomy, organ transplant, chronic steroids)    Protocols used: [id number], COUGH - ACUTE [id number]

## 2021-12-22 NOTE — PROGRESS NOTES
Assessment & Plan     Sore throat  Patient has ongoing sore throat and laryngitis.  She went to urgent care a week ago had a negative strep test.  She has done to rapid Covid swabs which were negative.  She is also having congestion in her sinuses.  We will treat for possible sinus infection.  Advised her to get the Covid swab done to ensure the PCR is also negative, if that is she can return to work if she is feeling better  - amoxicillin-clavulanate (AUGMENTIN) 875-125 MG tablet; Take 1 tablet by mouth 2 times daily for 7 days  - Symptomatic; Yes; 12/15/2021 COVID-19 Virus (Coronavirus) by PCR Nose; Future  - Symptomatic; Yes; 12/15/2021 COVID-19 Virus (Coronavirus) by PCR Nose    Cough  As above  - Symptomatic; Yes; 12/15/2021 COVID-19 Virus (Coronavirus) by PCR Nose; Future  - Symptomatic; Yes; 12/15/2021 COVID-19 Virus (Coronavirus) by PCR Nose    Nasal congestion  As above  - amoxicillin-clavulanate (AUGMENTIN) 875-125 MG tablet; Take 1 tablet by mouth 2 times daily for 7 days  - Symptomatic; Yes; 12/15/2021 COVID-19 Virus (Coronavirus) by PCR Nose; Future  - Symptomatic; Yes; 12/15/2021 COVID-19 Virus (Coronavirus) by PCR Nose      Return in about 1 week (around 12/29/2021), or if symptoms worsen or fail to improve.    CHEKO Branham, NP-C  [facility]    Full gown glove and N95 mask and face shield worn    Vicente Hanna is a 21 year old who presents for the following health issues  accompanied by her self.    HPI     Neg strep last week  Got COVID test at [facility] last week and again on Sunday, both negative, also rapid tests.   Dry cough, some mucus.  Losing her voice  Nasal congestion.   Not around any sick contacts.   Feels like things are not improving but not worsening.   No headaches/dizziness  No nausea/vomiting  Normal bowel/bladder.   Eating okay, drinking enough fluids     Review of Systems   Constitutional, HEENT-as above, cardiovascular, pulmonary, gi and gu systems  are negative, except as otherwise noted.      Objective    /75 (BP Location: Right arm, Patient Position: Sitting, Cuff Size: Adult Small)   Pulse 82   Temp 98.4  F (36.9  C) (Oral)   Resp 20   Wt 59 kg (130 lb)   SpO2 98%   BMI 18.65 kg/m    Body mass index is 18.65 kg/m .  Physical Exam   GENERAL: tired appearing, alert and no acute distress  EYES: Eyes grossly normal to inspection, PERRL and conjunctivae and sclerae normal  HENT: ear canals and TM's normal, nose and mouth without ulcers or lesions, slight pressure in her sinuses.  Patient unable to talk well due to loss of voice  NECK: no adenopathy, no asymmetry, masses, or scars and thyroid normal to palpation  RESP: lungs clear to auscultation - no rales, rhonchi or wheezes  CV: regular rate and rhythm, normal S1 S2, no S3 or S4, no murmur, click or rub  MS: no gross musculoskeletal defects noted    No results found for this or any previous visit (from the past 24 hour(s)).

## 2021-12-23 LAB — SARS-COV-2 RNA RESP QL NAA+PROBE: NEGATIVE

## 2021-12-23 NOTE — RESULT ENCOUNTER NOTE
Hi Cyndi,  Your COVID-19 swab is negative.  Please let me know if you have questions.  Thank you,  CHEKO Branham, NP-C  [facility]

## 2022-01-16 DIAGNOSIS — L70.0 ACNE VULGARIS: ICD-10-CM

## 2022-01-19 RX ORDER — TRETINOIN 0.5 MG/G
CREAM TOPICAL
Qty: 45 G | Refills: 0 | Status: SHIPPED | OUTPATIENT
Start: 2022-01-19 | End: 2023-03-14

## 2022-01-19 NOTE — TELEPHONE ENCOUNTER
One-time courtesy refill of tretinoin 0.05% cream provided until the patient is able to schedule follow-up in dermatology clinic.    Gaye Hewitt DO PGY-3  Department of Dermatology  [facility]

## 2022-01-19 NOTE — TELEPHONE ENCOUNTER
Tretinoin 0.05 % External Cream  Last Written Prescription Date:  11/13/2020  Last Fill Quantity: 45,   # refills: 2  Last Office Visit :  11/12/2020  Future Office visit:  None    Routing refill request to provider for review/approval because:  Second Request  Over due office visit.  Refer to clinic/provider for review      Sariah Carrera RN  Central Triage Red Flags/Med Refills

## 2022-02-09 ENCOUNTER — OFFICE VISIT (OUTPATIENT)
Dept: ORTHOPEDICS | Facility: CLINIC | Age: 22
End: 2022-02-09
Payer: COMMERCIAL

## 2022-02-09 ENCOUNTER — PRE VISIT (OUTPATIENT)
Dept: ORTHOPEDICS | Facility: CLINIC | Age: 22
End: 2022-02-09

## 2022-02-09 VITALS — HEIGHT: 70 IN | BODY MASS INDEX: 18.61 KG/M2 | WEIGHT: 130 LBS

## 2022-02-09 DIAGNOSIS — R63.4 LOSS OF WEIGHT: Primary | ICD-10-CM

## 2022-02-09 DIAGNOSIS — M25.562 PATELLOFEMORAL ARTHRALGIA OF LEFT KNEE: ICD-10-CM

## 2022-02-09 PROCEDURE — 99203 OFFICE O/P NEW LOW 30 MIN: CPT | Performed by: FAMILY MEDICINE

## 2022-02-09 ASSESSMENT — MIFFLIN-ST. JEOR: SCORE: 1434.93

## 2022-02-09 ASSESSMENT — PATIENT HEALTH QUESTIONNAIRE - PHQ9: SUM OF ALL RESPONSES TO PHQ QUESTIONS 1-9: 0

## 2022-02-09 NOTE — TELEPHONE ENCOUNTER
DIAGNOSIS: (L) knee pain / self referred / HP / no imaging   APPOINTMENT DATE: 2.9.22   NOTES STATUS DETAILS   MEDICATION LIST Internal

## 2022-02-09 NOTE — LETTER
"  2/9/2022      RE: Cyndi Grady  [address]  [facility] [address]        Subjective:   Cyndi Grady is a 21 year old female who is reporting with Left knee pain. About 3 weeks ago pt began noticing anterior left knee pain while being on her feet for long periods, especially while working as a .    Worse up and down stairs, worse with prolonged sitting and standing, worse when she has to bend the knee  Lost about 20 lbs, now 125 lbs, 5'101/2\"  Not working out  Smoothies, even more snacks, weight gain powder- amino acids  Willing to see nutrition  Meals 2 full and 2 snacks on average, sometimes less and sometimes more  POTS- no recent hospital stays, dx in 2013, tilt table testing  No cardiac hx in the family she's aware of now.  Stressors- pandemic stuff, life in 20s  Student, [place]  Working- , [employer] in [place]  Poor appetite, gets hungry in the middle of the night, late hours when studying  Working 4 days a week, 3 days of school- keeping up with it  Nausea at times  Treat- guac or tacos; no eating disorder hx- eats ice cream  Doesn't do raalyssa  Has family in [facility], big family  No food insecurity    Background:   Date of injury: NA   Duration of symptoms: 3 weeks  Mechanism of Injury: Insidious Onset; Working on feet for long periods  Intensity: 2/10 at rest, 4/10 with activity   Aggravating factors: General knee movement, walking, being on feet for long periods   Relieving Factors: rest and ice  Prior Evaluation: None    PAST MEDICAL, SOCIAL, SURGICAL AND FAMILY HISTORY: She  has a past medical history of Anxiety, Depression (01/101/2013), Depressive disorder, POTS (postural orthostatic tachycardia syndrome) (01/2012), and Uncomplicated asthma.She has no past medical history of Basal cell carcinoma, Malignant melanoma (H), or Squamous cell carcinoma of skin, unspecified.  She  has a past surgical history that includes hernia repair and hernia repair (N/A, 01/2001).  Her " "family history includes Breast Cancer in her maternal grandmother; Diabetes in her maternal grandmother.  She reports that she has never smoked. She has never used smokeless tobacco. She reports current drug use. Drug: Marijuana. She reports that she does not drink alcohol.    ALLERGIES: She is allergic to cats, dairy products [milk protein extract], dogs, egg [chicken-derived products (egg)], nuts [peanut-derived], peanuts [nuts], and sesame oil.    CURRENT MEDICATIONS: She has a current medication list which includes the following prescription(s): albuterol, benzoyl peroxide, budesonide-formoterol, clindamycin, epinephrine, hydroxyzine, loratadine-pseudoephedrine, mometasone, tretinoin, and triamcinolone.     REVIEW OF SYSTEMS: 10 point review of systems is negative except as noted above.     Exam:   Ht 1.778 m (5' 10\")   Wt 59 kg (130 lb)   BMI 18.65 kg/m             CONSTITUTIONAL: healthy, alert, mild distress and cooperative  HEAD: Normocephalic. No masses, lesions, tenderness or abnormalities  SKIN: no suspicious lesions or rashes  GAIT: antalgic  NEUROLOGIC: Non-focal, Normal muscle tone and strength, reflexes normal, sensation grossly normal.  PSYCHIATRIC: affect normal/bright and mentation appears normal.    MUSCULOSKELETAL: left knee pain      Inspection:       AP/lateral alignment normal  Tender: lateral patellar facet      Non-tender: medial patellar facet, MCL, LCL, lateral joint line, medial joint line, IT band, posterior knee   Active Range of Motion: all normal  Strength: quad  5-/5, Hamstrings  5/5, Gastroc  5/5, Tibialis anterior  5/5, Peroneals  5/5 and core strength  4/5 hip abductors and other core muscles   Special tests: normal Valgus stress test, normal Varus, negative Lachman's test, negative Dafne's, no apprehension with lateral stress of the patella.         Assessment/Plan:   Pt is a 20 yo AA female with PMhx of asthma, POTS presenting with nausea, loss of weight and left knee " pain    1. Nausea and weight loss-  See nutrition  Has an appt with PCP but appt is further out  2. Left knee - PFS  PT referral  Possible Couch taping    RTC 6 weeks, PRN  X-RAY INTERPRETATION:   none      Jenelle Delgado MD

## 2022-02-09 NOTE — PROGRESS NOTES
" Subjective:   Cyndi Grady is a 21 year old female who is reporting with Left knee pain. About 3 weeks ago pt began noticing anterior left knee pain while being on her feet for long periods, especially while working as a .    Worse up and down stairs, worse with prolonged sitting and standing, worse when she has to bend the knee  Lost about 20 lbs, now 125 lbs, 5'101/2\"  Not working out  Smoothies, even more snacks, weight gain powder- amino acids  Willing to see nutrition  Meals 2 full and 2 snacks on average, sometimes less and sometimes more  POTS- no recent hospital stays, dx in 2013, tilt table testing  No cardiac hx in the family she's aware of now.  Stressors- pandemic stuff, life in 20s  Student, [employer]  Working- , [employer] in [place]  Poor appetite, gets hungry in the middle of the night, late hours when studying  Working 4 days a week, 3 days of school- keeping up with it  Nausea at times  Treat- guac or tacos; no eating disorder hx- eats ice cream  Doesn't do raalyssa  Has family in [facility], big family  No food insecurity    Background:   Date of injury: NA   Duration of symptoms: 3 weeks  Mechanism of Injury: Insidious Onset; Working on feet for long periods  Intensity: 2/10 at rest, 4/10 with activity   Aggravating factors: General knee movement, walking, being on feet for long periods   Relieving Factors: rest and ice  Prior Evaluation: None    PAST MEDICAL, SOCIAL, SURGICAL AND FAMILY HISTORY: She  has a past medical history of Anxiety, Depression (01/101/2013), Depressive disorder, POTS (postural orthostatic tachycardia syndrome) (01/2012), and Uncomplicated asthma.She has no past medical history of Basal cell carcinoma, Malignant melanoma (H), or Squamous cell carcinoma of skin, unspecified.  She  has a past surgical history that includes hernia repair and hernia repair (N/A, 01/2001).  Her family history includes Breast Cancer in her maternal grandmother; Diabetes in her " "maternal grandmother.  She reports that she has never smoked. She has never used smokeless tobacco. She reports current drug use. Drug: Marijuana. She reports that she does not drink alcohol.    ALLERGIES: She is allergic to cats, dairy products [milk protein extract], dogs, egg [chicken-derived products (egg)], nuts [peanut-derived], peanuts [nuts], and sesame oil.    CURRENT MEDICATIONS: She has a current medication list which includes the following prescription(s): albuterol, benzoyl peroxide, budesonide-formoterol, clindamycin, epinephrine, hydroxyzine, loratadine-pseudoephedrine, mometasone, tretinoin, and triamcinolone.     REVIEW OF SYSTEMS: 10 point review of systems is negative except as noted above.     Exam:   Ht 1.778 m (5' 10\")   Wt 59 kg (130 lb)   BMI 18.65 kg/m             CONSTITUTIONAL: healthy, alert, mild distress and cooperative  HEAD: Normocephalic. No masses, lesions, tenderness or abnormalities  SKIN: no suspicious lesions or rashes  GAIT: antalgic  NEUROLOGIC: Non-focal, Normal muscle tone and strength, reflexes normal, sensation grossly normal.  PSYCHIATRIC: affect normal/bright and mentation appears normal.    MUSCULOSKELETAL: left knee pain      Inspection:       AP/lateral alignment normal  Tender: lateral patellar facet      Non-tender: medial patellar facet, MCL, LCL, lateral joint line, medial joint line, IT band, posterior knee   Active Range of Motion: all normal  Strength: quad  5-/5, Hamstrings  5/5, Gastroc  5/5, Tibialis anterior  5/5, Peroneals  5/5 and core strength  4/5 hip abductors and other core muscles   Special tests: normal Valgus stress test, normal Varus, negative Lachman's test, negative Dafne's, no apprehension with lateral stress of the patella.         Assessment/Plan:   Pt is a 20 yo AA female with PMhx of asthma, POTS presenting with nausea, loss of weight and left knee pain    1. Nausea and weight loss-  See nutrition  Has an appt with PCP but appt is " further out  2. Left knee - PFS  PT referral  Possible Khoa taping    RTC 6 weeks, PRN  X-RAY INTERPRETATION:   none

## 2022-02-10 NOTE — PROGRESS NOTES
Physical Therapy Initial Evaluation  2/10/2022     Precautions/Restrictions/MD instructions: none     Injury/Condition Details:  Presenting Complaint Left knee pain   Onset Timing/Date About 3 weeks ago   Mechanism Insidious  Went back to work after being off a few days and knee progressively started hurting     Symptom Behavior Details    Primary Symptoms Pain - anterior knee, lateral to kneecap   Shifting with walking, feels unstable   Limping  Swelling at the end of a long work shift  General weakness   Worst Pain 9/10 (with walking up to 4 hours)   Symptom Provocators Long periods of standing and walking.   Sitting with knee bent  Up and down stairs, going down a little harder   Best Pain 0/10    Symptom Relievers Rest/non-use  Icing, wearing brace (general stabilizer brace bought at [employer])   Time of day dependent? No   Recent symptom change? symptoms worsening     Prior Testing/Intervention for current condition:  Prior Tests None   Prior Treatment none     Lifestyle & General Medical History:  Employment  - lots of time on feet   Usual physical activities  (within past year) No usual exercise   Orthopaedic history Hip pain   Notable medical history POTS, depression, fibromyalgia, weight loss  See Epic chart   Patient Reported Health fair     KNEE OBJECTIVE      Dynamic Movement Screen:  2 leg stance/Static Posture: Squinting patella L > R, mild hyper-extension bilaterally   2 leg squat:Excessive anterior knee excursion (reduced posterior hip excursion) and Femoral ADD/IR noted at B/L limbs (R > L). Mild pain at end-range    1 leg stance:   Right: normal  Left: increased femoral IR at stance limb    1 leg squat:   Right: excessive femoral IR/ADD and excessive anterior knee excursion  Left: proprioceptive challenge, excessive contralateral pelvic drop , excessive femoral IR/ADD and excessive anterior knee excursion    Gait: L knee turned inward. Generally WNL and non-antalgic.     Lower Extremity Strength  & Range of Motion    Knee Joint ROM   Hyperextension Extension Flexion   Left 5 deg 0 deg 150 deg*   Right 3 deg 0 deg 150 deg   *pain end-range flexion    Hip Joint ROM   Flex Ext ER IR ABD ADD   Left 110 deg NT deg ~60 deg ~45 deg NT deg NT deg   Right 110 deg NT deg ~60 deg ~45 deg NT deg NT deg     Lower Extremity Muscle Strength (x/5)   Hip ABD Hip Ext  Knee Ext Knee Flex   Left 3+/5 NT/5 4/5 NT/5   Right 4/5 NT/5 4/5 NT/5     Basic Muscle Activation:  Transversus Abdominus: deferred    Quadriceps  Quadriceps set Straight Leg Raise   Right Good Fair   Left Good Poor  -5 deg lag before cueing  -fatigue after 7 reps     Muscle Flexibility: deferred      Observations:    Knee Joint Effusion (Stroke Test Assessment):  Right: 0  Left: 0    Palpation:   Tender to palpation at the following structures: (L) lateral patella facet  NOT tender to palpation at the following structures: (L) medial patella facet, joint line, tibial tubercle, fat pad  -reports discomfort with medial glide of patella    Special tests:   Lateral patellar apprehension: (-) bilaterally    Patellofemoral assessment:   -LEFT: Lateral patellar tilt, Quadrant test (medial-lateral) 2.5 - 2 and Medial patellar facet TTP  -RIGHT: Quadrant test (medial-lateral) 2.5 - 2      ASSESSMENT/PLAN    KEY PT FINDINGS:  1) Notable quad and hip weakness, L > R  2) Poor squat mechanics in frontal plane  3) No effusion    Therapist Impression: Cyndi is a 21 year old year old female referred to physical therapy by Dr. Delgado for treatment of patellofemoral arthralgia. Patient presents to physical therapy with c/o left anterior knee pain. Subjective history and objective findings are consistent with patellofemoral pain syndrome. Due to these impairments, patient is unable to sit with knees bent, navigate stairs, or tolerate walking at work for a normal shift. Patient will benefit from skilled PT emphasizing progressive quad & glute strengthening, squat mechanics  re-training, activity modification, and taping to address impairments/limitations in order to reach patient's goals, facilitate return to prior level of function, and maximize participation.    Patient is a 21 year old female with left side knee complaints.    Patient has the following significant findings with corresponding treatment plan.                Diagnosis 1:  Left Knee PFPS  Pain -  hot/cold therapy, US, electric stimulation, mechanical traction, manual therapy, splint/taping/bracing/orthotics, self management, education and home program  Decreased strength - therapeutic exercise, therapeutic activities and home program  Impaired muscle performance - biofeedback, electric stimulation, neuro re-education and home program  Decreased function - therapeutic activities and home program    Therapy Evaluation Codes:   1) History comprised of:   Personal factors that impact the plan of care:      None.    Comorbidity factors that impact the plan of care are:      Depression, Pain at night/rest, Weakness and Postural Orthostatic Tachycardic Syndrome.     Medications impacting care: None.  2) Examination of Body Systems comprised of:   Body structures and functions that impact the plan of care:      Hip, Knee and Pelvis.   Activity limitations that impact the plan of care are:      Bending, Jumping, Sitting, Squatting/kneeling, Stairs, Walking and Working.  3) Clinical presentation characteristics are:   Stable/Uncomplicated.  4) Decision-Making    Low complexity using standardized patient assessment instrument and/or measureable assessment of functional outcome.  Cumulative Therapy Evaluation is: Low complexity.    Previous and current functional limitations:  (See Goal Flow Sheet for this information)    Short term and Long term goals: (See Goal Flow Sheet for this information)     Communication ability:  Patient appears to be able to clearly communicate and understand verbal and written communication and follow  directions correctly.  Treatment Explanation - The following has been discussed with the patient:   RX ordered/plan of care  Anticipated outcomes  Possible risks and side effects  This patient would benefit from PT intervention to resume normal activities.   Rehab potential is good.    Frequency:  1 X week, once daily  Duration:  for 2 weeks tapering to 2 X a month over 6 weeks  Discharge Plan:  Achieve all LTG.  Independent in home treatment program.  Return to previous functional level by discharge.  Reach maximal therapeutic benefit.    Please refer to the daily flowsheet for treatment today, total treatment time and time spent performing 1:1 timed codes.

## 2022-02-11 ENCOUNTER — THERAPY VISIT (OUTPATIENT)
Dept: PHYSICAL THERAPY | Facility: CLINIC | Age: 22
End: 2022-02-11
Attending: FAMILY MEDICINE
Payer: COMMERCIAL

## 2022-02-11 DIAGNOSIS — M25.562 PATELLOFEMORAL ARTHRALGIA OF LEFT KNEE: ICD-10-CM

## 2022-02-11 DIAGNOSIS — M22.2X2 PATELLOFEMORAL PAIN SYNDROME OF LEFT KNEE: ICD-10-CM

## 2022-02-11 DIAGNOSIS — M25.562 ACUTE PAIN OF LEFT KNEE: ICD-10-CM

## 2022-02-11 PROCEDURE — 97110 THERAPEUTIC EXERCISES: CPT | Mod: GP

## 2022-02-11 PROCEDURE — 97530 THERAPEUTIC ACTIVITIES: CPT | Mod: GP

## 2022-02-11 PROCEDURE — 97161 PT EVAL LOW COMPLEX 20 MIN: CPT | Mod: GP

## 2022-02-11 ASSESSMENT — ACTIVITIES OF DAILY LIVING (ADL)
RISE FROM A CHAIR: ACTIVITY IS FAIRLY DIFFICULT
WALK: ACTIVITY IS FAIRLY DIFFICULT
AS_A_RESULT_OF_YOUR_KNEE_INJURY,_HOW_WOULD_YOU_RATE_YOUR_CURRENT_LEVEL_OF_DAILY_ACTIVITY?: ABNORMAL
RAW_SCORE: 31
PAIN: THE SYMPTOM AFFECTS MY ACTIVITY SEVERELY
GIVING WAY, BUCKLING OR SHIFTING OF KNEE: THE SYMPTOM AFFECTS MY ACTIVITY MODERATELY
SIT WITH YOUR KNEE BENT: ACTIVITY IS FAIRLY DIFFICULT
WEAKNESS: THE SYMPTOM AFFECTS MY ACTIVITY SLIGHTLY
GO UP STAIRS: ACTIVITY IS FAIRLY DIFFICULT
LIMPING: THE SYMPTOM AFFECTS MY ACTIVITY SEVERELY
KNEE_ACTIVITY_OF_DAILY_LIVING_SCORE: 44.29
HOW_WOULD_YOU_RATE_THE_CURRENT_FUNCTION_OF_YOUR_KNEE_DURING_YOUR_USUAL_DAILY_ACTIVITIES_ON_A_SCALE_FROM_0_TO_100_WITH_100_BEING_YOUR_LEVEL_OF_KNEE_FUNCTION_PRIOR_TO_YOUR_INJURY_AND_0_BEING_THE_INABILITY_TO_PERFORM_ANY_OF_YOUR_USUAL_DAILY_ACTIVITIES?: 30
SQUAT: ACTIVITY IS FAIRLY DIFFICULT
GO DOWN STAIRS: ACTIVITY IS FAIRLY DIFFICULT
KNEEL ON THE FRONT OF YOUR KNEE: ACTIVITY IS FAIRLY DIFFICULT
SWELLING: I HAVE THE SYMPTOM BUT IT DOES NOT AFFECT MY ACTIVITY
STAND: ACTIVITY IS SOMEWHAT DIFFICULT
HOW_WOULD_YOU_RATE_THE_OVERALL_FUNCTION_OF_YOUR_KNEE_DURING_YOUR_USUAL_DAILY_ACTIVITIES?: ABNORMAL
STIFFNESS: THE SYMPTOM AFFECTS MY ACTIVITY SLIGHTLY
KNEE_ACTIVITY_OF_DAILY_LIVING_SUM: 31

## 2022-02-17 ENCOUNTER — TELEPHONE (OUTPATIENT)
Dept: PEDIATRICS | Facility: CLINIC | Age: 22
End: 2022-02-17

## 2022-02-17 ENCOUNTER — THERAPY VISIT (OUTPATIENT)
Dept: PHYSICAL THERAPY | Facility: CLINIC | Age: 22
End: 2022-02-17
Attending: FAMILY MEDICINE
Payer: COMMERCIAL

## 2022-02-17 DIAGNOSIS — M25.562 ACUTE PAIN OF LEFT KNEE: ICD-10-CM

## 2022-02-17 DIAGNOSIS — M22.2X2 PATELLOFEMORAL PAIN SYNDROME OF LEFT KNEE: ICD-10-CM

## 2022-02-17 PROCEDURE — 97530 THERAPEUTIC ACTIVITIES: CPT | Mod: GP

## 2022-02-17 PROCEDURE — 97110 THERAPEUTIC EXERCISES: CPT | Mod: GP

## 2022-02-17 NOTE — TELEPHONE ENCOUNTER
Reason for Call:  Other call back    Detailed comments: Pt's mother has a couple questions she would like to ask Dr. Zuniga    Phone Number Patient can be reached at: Home number on file [phone] (home)    Best Time: Anytime    Can we leave a detailed message on this number? YES    Call taken on 2/17/2022 at 8:55 AM by Deisi Suarez

## 2022-02-17 NOTE — TELEPHONE ENCOUNTER
Mom want to ask for a recommendation and is reaching out to Dr. Zuniga since she knows Cyndi's history.    Daughter stopped going to PT during covid. Was having knee problems. She has lost a lot of weight and muscle mass. Has lost 20 lbs in the last year. Was seen by ortho. They though her knee pain was caused by the weight loss. She has not really established a new PCP. She has seen multiple providers at the Primary care clinic [place] [facility] Tried to schedule an appointment, but was unable to get in until March.  Mom is very concerned about weight loss and the inability to see a provider until March.    Does Dr. Zuniga recommend a PCP provider or would she be willing to see her for this issue?    Meera Mason RN

## 2022-02-17 NOTE — TELEPHONE ENCOUNTER
Ivy Nagel RN contacted mom of Cyndi on 02/17/22 and left a message asking for a call back regarding her questions for Dr. Zuniga. If patient calls back please contact RN team at [phone].    Joselin Nagel RN

## 2022-02-21 ENCOUNTER — LAB REQUISITION (OUTPATIENT)
Dept: LAB | Facility: CLINIC | Age: 22
End: 2022-02-21
Payer: COMMERCIAL

## 2022-02-21 DIAGNOSIS — R63.4 ABNORMAL WEIGHT LOSS: ICD-10-CM

## 2022-02-21 LAB
ALBUMIN SERPL-MCNC: 4 G/DL (ref 3.4–5)
ALP SERPL-CCNC: 45 U/L (ref 40–150)
ALT SERPL W P-5'-P-CCNC: 21 U/L (ref 0–50)
ANION GAP SERPL CALCULATED.3IONS-SCNC: 5 MMOL/L (ref 3–14)
AST SERPL W P-5'-P-CCNC: 13 U/L (ref 0–45)
BILIRUB SERPL-MCNC: 0.6 MG/DL (ref 0.2–1.3)
BUN SERPL-MCNC: 9 MG/DL (ref 7–30)
CALCIUM SERPL-MCNC: 9.5 MG/DL (ref 8.5–10.1)
CHLORIDE BLD-SCNC: 107 MMOL/L (ref 94–109)
CO2 SERPL-SCNC: 28 MMOL/L (ref 20–32)
CREAT SERPL-MCNC: 0.75 MG/DL (ref 0.52–1.04)
ERYTHROCYTE [DISTWIDTH] IN BLOOD BY AUTOMATED COUNT: 12.8 % (ref 10–15)
GFR SERPL CREATININE-BSD FRML MDRD: >90 ML/MIN/1.73M2
GLUCOSE BLD-MCNC: 64 MG/DL (ref 70–99)
HCT VFR BLD AUTO: 43.5 % (ref 35–47)
HGB BLD-MCNC: 13.5 G/DL (ref 11.7–15.7)
MCH RBC QN AUTO: 27.1 PG (ref 26.5–33)
MCHC RBC AUTO-ENTMCNC: 31 G/DL (ref 31.5–36.5)
MCV RBC AUTO: 87 FL (ref 78–100)
PLATELET # BLD AUTO: 248 10E3/UL (ref 150–450)
POTASSIUM BLD-SCNC: 4.2 MMOL/L (ref 3.4–5.3)
PROT SERPL-MCNC: 7 G/DL (ref 6.8–8.8)
RBC # BLD AUTO: 4.99 10E6/UL (ref 3.8–5.2)
SODIUM SERPL-SCNC: 140 MMOL/L (ref 133–144)
TSH SERPL DL<=0.005 MIU/L-ACNC: 1.23 MU/L (ref 0.4–4)
WBC # BLD AUTO: 6.2 10E3/UL (ref 4–11)

## 2022-02-21 PROCEDURE — 84443 ASSAY THYROID STIM HORMONE: CPT | Mod: ORL | Performed by: INTERNAL MEDICINE

## 2022-02-21 PROCEDURE — 85027 COMPLETE CBC AUTOMATED: CPT | Mod: ORL | Performed by: INTERNAL MEDICINE

## 2022-02-21 PROCEDURE — 80053 COMPREHEN METABOLIC PANEL: CPT | Mod: ORL | Performed by: INTERNAL MEDICINE

## 2022-02-22 ENCOUNTER — TELEPHONE (OUTPATIENT)
Dept: INTERNAL MEDICINE | Facility: CLINIC | Age: 22
End: 2022-02-22
Payer: COMMERCIAL

## 2022-02-22 DIAGNOSIS — M22.2X2 PATELLOFEMORAL PAIN SYNDROME OF LEFT KNEE: Primary | ICD-10-CM

## 2022-02-22 NOTE — TELEPHONE ENCOUNTER
[facility]    Phone Message    May a detailed message be left on voicemail: yes     Reason for Call: Other: The patient said she would like for the PT Referral to [address] [place], [place] [address] [phone]  Fax: [phone], the caller said this needs to be updated in the [employer] systems, please call back to obtain the needed information thank you.        We call the patient this morning in stead of calling the Mother (Imani) please try call again using  [phone]    Action Taken: Message routed to:  [facility] pcc    Travel Screening: Not Applicable

## 2022-02-22 NOTE — TELEPHONE ENCOUNTER
Spoke with mom and informed her that I will be sending the External PT referral over to the desired location shortly.  I provided our clinic number for her to call in case any additional questions arise.      *faxed*   - Markos Graham ATC

## 2022-02-22 NOTE — TELEPHONE ENCOUNTER
[facility] Call [facility]    Phone Message    May a detailed message be left on voicemail: yes     Reason for Call: Other: Pt requesting call back asap. Pt stated she has an appt with PT today and was instructed that her referral needs to be updated. pt stated it is through [facility]      Action Taken: Message routed to:  [facility] erasmo

## 2022-02-23 ENCOUNTER — MEDICAL CORRESPONDENCE (OUTPATIENT)
Dept: HEALTH INFORMATION MANAGEMENT | Facility: CLINIC | Age: 22
End: 2022-02-23
Payer: COMMERCIAL

## 2022-02-24 ENCOUNTER — TELEPHONE (OUTPATIENT)
Dept: ORTHOPEDICS | Facility: CLINIC | Age: 22
End: 2022-02-24
Payer: COMMERCIAL

## 2022-02-24 NOTE — TELEPHONE ENCOUNTER
[facility]    Phone Message    May a detailed message be left on voicemail: yes     Reason for Call Markos Fax number You have is incorrect. Correct fax is [phone] . Mom wants this Fax Today Please.Action Taken: Message routed to:  [facility] loly    Travel Screening: Not Applicable

## 2022-02-24 NOTE — TELEPHONE ENCOUNTER
I returned the mother's phone call and let her know the physical therapy order was faxed to the new fax number [phone]. Mother was appreciative. All questions were answered at this time. Corin Patten ATC on 2/24/2022 at 10:12 AM

## 2022-02-25 ENCOUNTER — TELEPHONE (OUTPATIENT)
Dept: ORTHOPEDICS | Facility: CLINIC | Age: 22
End: 2022-02-25
Payer: COMMERCIAL

## 2022-02-25 NOTE — TELEPHONE ENCOUNTER
[facility]    Phone Message    May a detailed message be left on voicemail: yes     Reason for Call: Other: Patient's mother is calling stating the referral is missing info. Please call mother      Action Taken: Message routed to:  [facility] ortho    Travel Screening: Not Applicable

## 2022-02-25 NOTE — TELEPHONE ENCOUNTER
ATC attempted to contact patient but spoke with patient's Mother. ATC confirmed that Dr. Delgado has seen the patient for left knee pain, which what the physical therapy referral reflects. Patient's Mother states her daughter is needing additional physical therapy for a cardiac issue. LIVIER explained that patient should reach out to her primary care provider, Dr. Nicolasa Brown, regarding this referral as Dr. Delgado has not seen the patient for that issue.    Provided return call number of [phone].    LIVIER Abdalla

## 2022-03-05 ENCOUNTER — HEALTH MAINTENANCE LETTER (OUTPATIENT)
Age: 22
End: 2022-03-05

## 2022-03-16 ENCOUNTER — OFFICE VISIT (OUTPATIENT)
Dept: INTERNAL MEDICINE | Facility: CLINIC | Age: 22
End: 2022-03-16
Payer: COMMERCIAL

## 2022-03-16 VITALS
HEIGHT: 70 IN | OXYGEN SATURATION: 97 % | BODY MASS INDEX: 18.24 KG/M2 | DIASTOLIC BLOOD PRESSURE: 71 MMHG | HEART RATE: 66 BPM | WEIGHT: 127.4 LBS | SYSTOLIC BLOOD PRESSURE: 116 MMHG

## 2022-03-16 DIAGNOSIS — F32.9 REACTIVE DEPRESSION: ICD-10-CM

## 2022-03-16 DIAGNOSIS — G90.A POTS (POSTURAL ORTHOSTATIC TACHYCARDIA SYNDROME): Primary | ICD-10-CM

## 2022-03-16 DIAGNOSIS — M25.562 LEFT KNEE PAIN, UNSPECIFIED CHRONICITY: ICD-10-CM

## 2022-03-16 PROCEDURE — 99214 OFFICE O/P EST MOD 30 MIN: CPT | Performed by: INTERNAL MEDICINE

## 2022-03-16 NOTE — NURSING NOTE
Cyndi Grady is a 22 year old female patient that presents today in clinic for the following:    Chief Complaint   Patient presents with     RECHECK     needs referral for PT     The patient's allergies and medications were reviewed as noted. A set of vitals were recorded as noted without incident. The patient does not have any other questions for the provider.    Ajay Ocampo, EMT at 1:28 PM on 3/16/2022

## 2022-03-17 ENCOUNTER — TELEPHONE (OUTPATIENT)
Dept: INTERNAL MEDICINE | Facility: CLINIC | Age: 22
End: 2022-03-17
Payer: COMMERCIAL

## 2022-03-17 DIAGNOSIS — G90.A POTS (POSTURAL ORTHOSTATIC TACHYCARDIA SYNDROME): Primary | ICD-10-CM

## 2022-03-17 NOTE — TELEPHONE ENCOUNTER
[facility]    Phone Message    May a detailed message be left on voicemail: yes     Reason for Call: Other:    The patient mother was calling to provide the Fax numbers to the insurance and the Physical therapy Facility, also please make sure the referral reflects the beginning and end dates on the  (2/23/22 - 2/23/23) on the referral documents thank you.    [facility] Fax #: ([phone])     [facility] Fax #: ([phone])    Action Taken: Message routed to:  [facility] pcc    Travel Screening: Not Applicable

## 2022-03-17 NOTE — TELEPHONE ENCOUNTER
Left a message for the patients mother to call the clinic to let us know the fax numbers for:    [facility] Fax #: (please ask for number)  [facility] Fax #: (please ask for number)    Karen Pascual LPN 3/17/2022 3:22 PM

## 2022-03-17 NOTE — TELEPHONE ENCOUNTER
[facility]    Phone Message    May a detailed message be left on voicemail: yes     Reason for Call: Other: Pt's mother Imani requesting call back to discuss the referral to [facility]. Imani stated it needs to have the dates of 2/23/22 through 2/23/23, it also needs to be sent to [facility] and the pt's [facility] insurance      Action Taken: Message routed to:  [facility] erasmo

## 2022-03-21 ENCOUNTER — MEDICAL CORRESPONDENCE (OUTPATIENT)
Dept: HEALTH INFORMATION MANAGEMENT | Facility: CLINIC | Age: 22
End: 2022-03-21
Payer: COMMERCIAL

## 2022-03-21 NOTE — TELEPHONE ENCOUNTER
[facility] referral form faxed today, and confirmed with the patient.    Rita Mahan on 3/21/2022 at 2:01 PM

## 2022-03-21 NOTE — TELEPHONE ENCOUNTER
[facility]    Phone Message    May a detailed message be left on voicemail: yes     Reason for Call: Other: Nadine calling from [facility] about PT referral to [facility]. They have the referral but it still needs to be on file with [facility]. Please fax referral to [facility]. Please call back to confirm when the order is ready to go so they know when to get started.       #: [phone]    Action Taken: Message routed to:  [facility]    Travel Screening: Not Applicable

## 2022-03-25 NOTE — PROGRESS NOTES
Cyndi was seen today for follow up and recheck.  Since our last visit she's had one ER visit for an allergic reaction (food/other, unknown), an ENT visit for tonsil stones, and an NP visit for ER follow up and also chronic pain.  At that time she was referred to [place], and also recommended that she participate in PT.  Cyndi used to do more PT prior to the pandemic, however that has fallen to the wayside a bit.      Mood is a bit down/depressed. She's lost a bit of weight, has not been eating as much.  We discussed referral to Mental Health and also dietary changes to increase protein and calorie intake.    We discussed POTS syndrome and usual management, including PT, slower position changes, liberal fluid and salt intake, and wearing compression garments.    Current Outpatient Medications:      albuterol (PROAIR HFA/PROVENTIL HFA/VENTOLIN HFA) 108 (90 Base) MCG/ACT inhaler, Inhale 2 puffs into the lungs every 6 hours, Disp: 18 g, Rfl: 1     benzoyl peroxide 5 % external liquid, Use daily as directed. Use for face, chest, arms, back., Disp: 226 g, Rfl: 2     budesonide-formoterol (SYMBICORT) 80-4.5 MCG/ACT Inhaler, Inhale 2 puffs by mouth twice daily, Disp: 11 g, Rfl: 0     clindamycin (CLEOCIN T) 1 % external lotion, Apply topically daily, Disp: 60 mL, Rfl: 11     EPINEPHrine (ANY BX GENERIC EQUIV) 0.3 MG/0.3ML injection 2-pack, Inject 0.3 mLs (0.3 mg) into the muscle once as needed for anaphylaxis, Disp: 2 each, Rfl: 0     hydrOXYzine (ATARAX) 25 MG tablet, Take 1 tablet (25 mg) by mouth 3 times daily as needed for anxiety, Disp: 90 tablet, Rfl: 3     loratadine-pseudoePHEDrine (CLARITIN-D 24-HOUR)  MG 24 hr tablet, Take 1 tablet by mouth daily Reported on 3/16/2017, Disp: 14 tablet, Rfl: 11     mometasone (NASONEX) 50 MCG/ACT nasal spray, Spray 2 sprays into both nostrils daily, Disp: 17 g, Rfl: 3     tretinoin (RETIN-A) 0.05 % external cream, APPLY  CREAM TOPICALLY TO AFFECTED AREA AT BEDTIME, Disp: 45 g,  "Rfl: 0     triamcinolone (KENALOG) 0.1 % external ointment, Apply sparingly to affected area three times daily for 14 days.  Not to be used on the face., Disp: 15 g, Rfl: 3    On exam  /71 (BP Location: Right arm, Patient Position: Sitting, Cuff Size: Adult Regular)   Pulse 66   Ht 1.778 m (5' 10\")   Wt 57.8 kg (127 lb 6.4 oz)   LMP 03/05/2022 (Exact Date)   SpO2 97%   Breastfeeding No   BMI 18.28 kg/m     Cor RRR  Lungs CTA  Abd +BS, NT  Ext no edema or rash    A/P 22 year old here for f/u    -     Physical Therapy Referral; Future  -     Physical Therapy Referral; Future    Left knee pain, unspecified chronicity    -     Physical Therapy Referral; Future    Reactive depression  -     Adult Mental Health  Referral; Future    I spent 20 minutes with Cyndi and another 10 min chart review and documentation same day    Nicolasa Henderson MD    "

## 2022-03-28 ENCOUNTER — TELEPHONE (OUTPATIENT)
Dept: INTERNAL MEDICINE | Facility: CLINIC | Age: 22
End: 2022-03-28
Payer: COMMERCIAL

## 2022-03-28 NOTE — TELEPHONE ENCOUNTER
[facility]    Phone Message    May a detailed message be left on voicemail: yes     Reason for Call: Other: Nadine was calling to get clarification on a recent PT referral, she wanted to know what the status of the insurance referral which I needed before physical therapy can start and be covered by the insurance company please follow up with updates thank you.     Action Taken: Message routed to:  [facility] pcc    Travel Screening: Not Applicable

## 2022-03-29 NOTE — TELEPHONE ENCOUNTER
[facility]    Phone Message    May a detailed message be left on voicemail: yes     Reason for Call: Other: Please re fax referral to [facility] at [phone]. It can also be submitted by the provider portal.     Action Taken: Message routed to:  [facility]    Travel Screening: Not Applicable

## 2022-03-29 NOTE — TELEPHONE ENCOUNTER
Referral form was re faxed to [facility] for PT.  Nadine at [facility] was notified.    Rita Mahan on 3/29/2022 at 9:19 AM

## 2022-04-29 ENCOUNTER — TELEPHONE (OUTPATIENT)
Dept: INTERNAL MEDICINE | Facility: CLINIC | Age: 22
End: 2022-04-29
Payer: COMMERCIAL

## 2022-04-29 NOTE — TELEPHONE ENCOUNTER
[facility]    Phone Message    May a detailed message be left on voicemail: yes     Reason for Call: Order(s): Home Care Orders: Physical Therapy (PT): Per call from Nadine, requesting call back to correct Physical Therapy order for insurance coverage purposes    Action Taken: Message routed to:  [facility]    Travel Screening: Not Applicable

## 2022-04-29 NOTE — TELEPHONE ENCOUNTER
Returned call. RN requesting PT referral service code to be changed in order for the patient's insurance to cover the referral. The correct service code is 1502. Will route for review.    Sterling Matt MA on 4/29/2022 at 9:56 AM

## 2022-05-05 ENCOUNTER — TELEPHONE (OUTPATIENT)
Dept: INTERNAL MEDICINE | Facility: CLINIC | Age: 22
End: 2022-05-05
Payer: COMMERCIAL

## 2022-05-05 NOTE — TELEPHONE ENCOUNTER
Spoke to Nadine who stated that an insurance referral that they received had the code of 1007 (consult and treat-no tests). [facility] is stating that they need the 4 digit service code to be 1502(Physical Therapy).     Insurance referral was updated to reflect the code of 1502 and refaxed to  Insurance referral:[phone] and [facility]:[phone]. Karen Pascual LPN 5/5/2022 4:03 PM

## 2022-05-05 NOTE — TELEPHONE ENCOUNTER
Insurance referral was updated with 1502 and refaxed to "[employer]. Insurance referral was scanned into the patients armin. Karen Pascual LPN 5/5/2022 4:12 PM

## 2022-05-05 NOTE — TELEPHONE ENCOUNTER
Nadine from [facility] is calling as Cyndi is a patient of theres being seen for physical therapy. Her insurance will not cover the referral on file as there is an incorrect type of service. The referral needs to have physical therapy (code 1502) listed as the type of service to be covered.    Nicole Nguyen RN

## 2022-05-25 ENCOUNTER — IMMUNIZATION (OUTPATIENT)
Dept: NURSING | Facility: CLINIC | Age: 22
End: 2022-05-25
Payer: COMMERCIAL

## 2022-05-25 PROCEDURE — 91305 COVID-19,PF,PFIZER (12+ YRS): CPT

## 2022-05-25 PROCEDURE — 0054A COVID-19,PF,PFIZER (12+ YRS): CPT

## 2022-06-27 ENCOUNTER — HOSPITAL ENCOUNTER (EMERGENCY)
Facility: CLINIC | Age: 22
Discharge: HOME | End: 2022-06-27
Payer: COMMERCIAL

## 2022-06-27 VITALS
OXYGEN SATURATION: 98 % | SYSTOLIC BLOOD PRESSURE: 120 MMHG | TEMPERATURE: 98.1 F | WEIGHT: 130 LBS | DIASTOLIC BLOOD PRESSURE: 69 MMHG | RESPIRATION RATE: 20 BRPM | HEIGHT: 70 IN | HEART RATE: 54 BPM | BODY MASS INDEX: 18.61 KG/M2

## 2022-06-27 NOTE — ED TRIAGE NOTES
Pt reports an onset of a mirgraine HA this morning. Pt states the OTC meds haven't helped.      Triage Assessment     Row Name 06/27/22 7382       Triage Assessment (Adult)    Airway WDL WDL       Respiratory WDL    Respiratory WDL WDL       Skin Circulation/Temperature WDL    Skin Circulation/Temperature WDL WDL       Cardiac WDL    Cardiac WDL WDL       Peripheral/Neurovascular WDL    Peripheral Neurovascular WDL WDL       Cognitive/Neuro/Behavioral WDL    Cognitive/Neuro/Behavioral WDL WDL

## 2022-06-28 ENCOUNTER — OFFICE VISIT (OUTPATIENT)
Dept: CARDIOLOGY | Facility: CLINIC | Age: 22
End: 2022-06-28
Payer: COMMERCIAL

## 2022-06-28 ENCOUNTER — TELEPHONE (OUTPATIENT)
Dept: CARDIOLOGY | Facility: CLINIC | Age: 22
End: 2022-06-28

## 2022-06-28 VITALS
BODY MASS INDEX: 18.37 KG/M2 | OXYGEN SATURATION: 100 % | SYSTOLIC BLOOD PRESSURE: 124 MMHG | HEART RATE: 63 BPM | DIASTOLIC BLOOD PRESSURE: 84 MMHG | WEIGHT: 128 LBS

## 2022-06-28 DIAGNOSIS — G90.A POTS (POSTURAL ORTHOSTATIC TACHYCARDIA SYNDROME): Primary | ICD-10-CM

## 2022-06-28 PROCEDURE — 93000 ELECTROCARDIOGRAM COMPLETE: CPT | Performed by: INTERNAL MEDICINE

## 2022-06-28 PROCEDURE — 99205 OFFICE O/P NEW HI 60 MIN: CPT | Performed by: INTERNAL MEDICINE

## 2022-06-28 NOTE — TELEPHONE ENCOUNTER
[facility]    Phone Message    May a detailed message be left on voicemail: yes     Reason for Call: Form or Letter   Type or form/letter needing completion: Work Note stating that she had a health appt  Provider: Dr. Guerrero  Date form needed: asap  Once completed: Send to pt on mychart    Action Taken: Message routed to:  Adult Clinics: Cardiology p [id number]    Travel Screening: Not Applicable

## 2022-06-28 NOTE — PROGRESS NOTES
Cyndi Grady's goals for this visit include:     She requests these members of her care team be copied on today's visit information: PCP    PCP: Nicolasa Blackman    Referring Provider:  Referred Self, MD  No address on file    /84 (BP Location: Left arm, Patient Position: Sitting, Cuff Size: Adult Small)   Pulse 63   Wt 58.1 kg (128 lb)   SpO2 100%   BMI 18.37 kg/m      Do you need any medication refills at today's visit? No.    Patrick Nuno, EMT  Clinic Support  [facility]    [phone]    Employed by [facility]      I am delighted to see Cyndi Grady as a new patient in [place] cardiology clinic for evaluation of lightheadedness and dizziness. She is here with her mother.    History of Present Illness:  The patient is a 22 year old  Female who was diagnosed with POTS in 2014 (age 14) with a tilt table test at [facility]. [facility]atics during that visit showed stable BP but an increase in HR from baseline 79 to 134 with standing, with symptoms of dizziness, increased warmth. She was recommended regular meals, aggressive hydration, salt liberalization.    She currently lives alone, works as a  at a restaurant. Her symptoms are most pronounced with positional changes: sitting or standing especially after waking up usually associated with lightheadedness and head pressure. No loss of consciousness. When she works as a  she remains on her feet and actually feels better without position changes. Her routine is taking up around 10am, she would lie in bed for 30-45 minute, then sit up for 15 min (usually starts to feel LH/head pressure with just sitting in bed). Then she gets up to shower and dress. First meal 1-2 pm. Goes to work from 4-9 pm, second meal at work ~ 8pm, goes to sleep around midnight. Drinks 5 cups of water. No significant caffeine, does not smoke,  no alcohol. She uses salt liberally, has not tried compression stockings.  Starting to go to physical therapy twice a week and feels better. Has frequent headaches - was in ED yesterday with HA, HR 66, BP 120s, given tylenol and zofran and sent home.    Past Medical History:  Asthma  Depression  Migraines     Medications:   Albuterol  Symbicort  Hydroxyzine prn  Claritin D  Nasal spray    Allergies:    Allergies   Allergen Reactions     Cats      Dairy Products [Milk Protein Extract]      Abdominal pain.     Dogs      Egg [Chicken-Derived Products (Egg)]      Nuts [Peanut-Derived] Hives and Swelling     Tree nuts-Cashew, pecans,walnuts.     Peanuts [Nuts] Hives and Swelling     Sesame Oil Hives and Swelling     Sesame seeds-not oil.       Physical examination  Vitals: /84 (BP Location: Left arm, Patient Position: Sitting, Cuff Size: Adult Small)   Pulse 63   Wt 58.1 kg (128 lb)   SpO2 100%   BMI 18.37 kg/m    BMI= Body mass index is 18.37 kg/m .    Orthostatics:  Supine 124/76, HR 63  Sitting 129/84, HR 63  Standing 3 min 133/85, HR 93    Constitutional: In general, the patient is in no acute distress, somewhat of a flat affect.  Cardiovascular: Carotids +2/2 bilaterally without bruits.  No jugular venous distension. Regular rate and rhythm. Normal S1, S2. No murmur, rub, click, or gallop.   Extremities: Pulses are normal bilaterally throughout. No peripheral edema.  Respiratory: Clear to asculation.  No ronchi, wheezes, rales.  No dullness to percussion.     I have personally and independently reviewed the following:  Labs:   2022: K 4.2, cr 0.75, hgb 13.5, plt 248K, TSH 1.23    EK2022: sinus 61 bpm, normal intervals    Assessment :  Postural orthostatic tachycardia syndrome with lightheadedness/head pressure upon sitting or standing. Overall stable to slightly improved. Stressed importance of regular meals, aggressive hydration, double current water intake. Encouraged her to continue  physical therapy, consider daily walks in addition.       I spent a total of 45 minutes face to face with  Cyndi Grady during today's office visit. I have spend an additional  15 minutes today on chart review and documentation.    The patient is to return as above . The patient understood the treatment plan as outlined above.  There were no barriers to learning.      Suzy Guerrero MD

## 2022-06-28 NOTE — LETTER
June 29, 2022      Cyndi Wick Grady  [address]  [place] [address]            To Whom it May Concern,     Please excuse Cyndi's absence from work on 6/28/22 as she had a medical appointment with me. Please contact my office with further questions or concerns.           Sincerely,      Suzy Guerrero MD

## 2022-06-28 NOTE — LETTER
June 29, 2022      Cyndi Wick Grady  [address]  [place] [address]            To Whom it May Concern,     Please excuse Cyndi's absence from work on 6/28/22 as she had a medical appointment with me. Please contact my office with further questions or concerns.           Sincerely,      Suzy Guerrero MD

## 2022-06-28 NOTE — PATIENT INSTRUCTIONS
The following is a summary of your office visit today:        Diagnosis: lightheadedness      Recommendations:  Aggressive hydration  Continue exercise      Follow up:as needed        If you have had any blood work, imaging or other testing completed we will be in touch within 1-2 weeks regarding the results. If you have any questions, concerns or need to schedule a follow up, please contact us at [phone] If you are needing refills please contact your pharmacy. For urgent after hour care please call the [facility] at [phone] or the [facility] at [phone] and ask to speak to the cardiologist on call.    It was a pleasure meeting with you today. Please let us know if there is anything else we can do for you so that we can be sure you are leaving completely satisfied with your care experience.     Your Cardiology Team at [facility]  Phone: [phone] Fax: [phone]  RN Care Coordinators: Phan  Support Staff: Patrick Damico            HOW TO CHECK YOUR BLOOD PRESSURE AT HOME:     Avoid eating, smoking, and exercising for at least 30 minutes before taking a reading.    Be sure you have taken your BP medication at least 2-3 hours before you check it.     Sit quietly for 10 minutes before a reading.     Sit in a chair with your feet flat on the floor. Rest your  arm on a table so that the arm cuff is at the same level as your heart.    Remain still during the reading.    Record your blood pressure and pulse in a log and bring to your next appointment.

## 2022-06-29 ENCOUNTER — HOSPITAL ENCOUNTER (EMERGENCY)
Facility: CLINIC | Age: 22
Discharge: HOME | End: 2022-06-29
Attending: EMERGENCY MEDICINE | Admitting: EMERGENCY MEDICINE
Payer: COMMERCIAL

## 2022-06-29 VITALS
OXYGEN SATURATION: 100 % | HEIGHT: 66 IN | SYSTOLIC BLOOD PRESSURE: 104 MMHG | WEIGHT: 130 LBS | DIASTOLIC BLOOD PRESSURE: 64 MMHG | BODY MASS INDEX: 20.89 KG/M2 | HEART RATE: 53 BPM | TEMPERATURE: 98.5 F | RESPIRATION RATE: 16 BRPM

## 2022-06-29 DIAGNOSIS — G43.019 INTRACTABLE MIGRAINE WITHOUT AURA AND WITHOUT STATUS MIGRAINOSUS: ICD-10-CM

## 2022-06-29 LAB
ATRIAL RATE - MUSE: 96 BPM
DIASTOLIC BLOOD PRESSURE - MUSE: NORMAL MMHG
INTERPRETATION ECG - MUSE: NORMAL
P AXIS - MUSE: 75 DEGREES
PR INTERVAL - MUSE: 134 MS
QRS DURATION - MUSE: 72 MS
QT - MUSE: 340 MS
QTC - MUSE: 429 MS
R AXIS - MUSE: 85 DEGREES
SYSTOLIC BLOOD PRESSURE - MUSE: NORMAL MMHG
T AXIS - MUSE: 65 DEGREES
VENTRICULAR RATE- MUSE: 96 BPM

## 2022-06-29 PROCEDURE — 96375 TX/PRO/DX INJ NEW DRUG ADDON: CPT

## 2022-06-29 PROCEDURE — 93005 ELECTROCARDIOGRAM TRACING: CPT

## 2022-06-29 PROCEDURE — 250N000011 HC RX IP 250 OP 636: Performed by: EMERGENCY MEDICINE

## 2022-06-29 PROCEDURE — 96361 HYDRATE IV INFUSION ADD-ON: CPT

## 2022-06-29 PROCEDURE — 99284 EMERGENCY DEPT VISIT MOD MDM: CPT | Mod: 25

## 2022-06-29 PROCEDURE — 96374 THER/PROPH/DIAG INJ IV PUSH: CPT

## 2022-06-29 PROCEDURE — 258N000003 HC RX IP 258 OP 636: Performed by: EMERGENCY MEDICINE

## 2022-06-29 RX ORDER — DIPHENHYDRAMINE HYDROCHLORIDE 50 MG/ML
25 INJECTION INTRAMUSCULAR; INTRAVENOUS ONCE
Status: COMPLETED | OUTPATIENT
Start: 2022-06-29 | End: 2022-06-29

## 2022-06-29 RX ORDER — KETOROLAC TROMETHAMINE 15 MG/ML
30 INJECTION, SOLUTION INTRAMUSCULAR; INTRAVENOUS ONCE
Status: COMPLETED | OUTPATIENT
Start: 2022-06-29 | End: 2022-06-29

## 2022-06-29 RX ORDER — METOCLOPRAMIDE HYDROCHLORIDE 5 MG/ML
10 INJECTION INTRAMUSCULAR; INTRAVENOUS ONCE
Status: COMPLETED | OUTPATIENT
Start: 2022-06-29 | End: 2022-06-29

## 2022-06-29 RX ADMIN — METOCLOPRAMIDE 10 MG: 5 INJECTION, SOLUTION INTRAMUSCULAR; INTRAVENOUS at 11:43

## 2022-06-29 RX ADMIN — DIPHENHYDRAMINE HYDROCHLORIDE 25 MG: 50 INJECTION, SOLUTION INTRAMUSCULAR; INTRAVENOUS at 11:41

## 2022-06-29 RX ADMIN — KETOROLAC TROMETHAMINE 30 MG: 15 INJECTION, SOLUTION INTRAMUSCULAR; INTRAVENOUS at 11:42

## 2022-06-29 RX ADMIN — SODIUM CHLORIDE 1000 ML: 9 INJECTION, SOLUTION INTRAVENOUS at 11:40

## 2022-06-29 ASSESSMENT — ENCOUNTER SYMPTOMS
NECK PAIN: 1
NECK STIFFNESS: 0
NAUSEA: 1
LIGHT-HEADEDNESS: 1
VOMITING: 0
DIARRHEA: 0
HEADACHES: 1
COUGH: 0
FEVER: 0
MYALGIAS: 1

## 2022-06-29 NOTE — ED PROVIDER NOTES
History   Chief Complaint:  Headache     The history is provided by the patient and medical records.      Cyndi Grady is a 22 year old female with history of asthma and POTS who presents with a headache. The patient states she had an onset of anterior head pressure migraine on Monday, 2 days ago. Headache has worsened over the past two days and is accompanied by nausea and lightheadedness. She endorses neck pain and right upper extremity pain. She denies fevers, cough, emesis, diarrhea, or neck stiffness. She notes symptoms are slightly relieved when she is lying supine. She states she has history of migraines, however notes her headaches usually resolve quickly. She normally takes Tylenol for her migraines. She has taken Tylenol and ibuprofen without improvement of symptoms. She denies taking dietary supplements. She denies recent long distance travel. She denies recent fall or head injury.     Review of Systems   Constitutional: Negative for fever.   Respiratory: Negative for cough.    Gastrointestinal: Positive for nausea. Negative for diarrhea and vomiting.   Musculoskeletal: Positive for myalgias (RUE) and neck pain. Negative for neck stiffness.   Neurological: Positive for light-headedness and headaches.   All other systems reviewed and are negative.    Allergies:  The patient has no known medication allergies.     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  PCP:  Nicolasa Blackman    Physical Exam     Patient Vitals for the past 24 hrs:   BP Temp Temp src Pulse Resp SpO2 Height Weight   06/29/22 1230 104/64 -- -- 53 --  "100 % -- --   06/29/22 1200 103/67 -- -- 61 -- 100 % -- --   06/29/22 1145 111/80 -- -- -- -- -- -- --   06/29/22 1028 117/70 98.5  F (36.9  C) Temporal 74 16 100 % 1.676 m (5' 6\") 59 kg (130 lb)     Physical Exam  General: Patient is alert and normal appearing.  HEENT: Head atraumatic    Eyes: pupils equal and reactive. Conjunctiva clear   Nares: patent   Oropharynx: no lesions, uvula midline, no palatal draping, normal voice, no trismus  Neck: Supple without lymphadenopathy, no meningismus  Chest: Heart regular rate and rhythm.   Lungs: Equal clear to auscultation with no wheeze or rales  Abdomen: Soft, non tender, nondistended, normal bowel sounds  Back: No costovertebral angle tenderness, no midline C, T or L spine tenderness  Neuro: Grossly nonfocal, normal speech, strength equal bilaterally, CN 2-12 intact, no pronator drift, normal gait  Extremities: No deformities, equal radial and DP pulses. No clubbing, cyanosis.  No edema  Skin: Warm and dry with no rash. '      Emergency Department Course   ECG  ECG taken at 1053, ECG read at 1115  Normal sinus rhythm  Biatrial enlargement   ST elevation, consider early repolarization, pericarditis, or injury   Abnormal ECG  No significant change as compared to prior ECG dated 09/15/19  Rate 96 bpm. NH interval 134. QRS duration 72. QT/QTc 340/429. P-R-T axes 75 85 65.    Emergency Department Course:     Reviewed:  I reviewed nursing notes, vitals, past medical history and Care Everywhere    Assessments:  1114 I obtained history and examined the patient as noted above.   1227 I rechecked the patient and explained findings.     Interventions:  1140 0.9% sodium chloride BOLUS 1000 mL IV  1141 Benadryl 25 mg IV  1142 Toradol 30 mg IV  1143 Reglan 10 mg IV    Disposition:  The patient was discharged to home.     Impression & Plan   Medical Decision Making:  Cyndi Grady is a 22 year old female who presents with a headache. She has history of headaches.  I " considered a broad differential diagnosis for this patient including tension, migraine, analgesic rebound, occipital neuralgia, etc.  I also considered other less common but serious causes considered included meningitis, encephalitis, subarachnoid bleed, temporal arteritis, stroke, tumor, etc.  She has no signs of serious headache etiologies at this point.  No advanced imaging is indicated, nor is CT/lumbar puncture for SAH.  Her questions were answered and she feels improved after above interventions in ED.  Supportive outpatient management is therefore indicated.  Headache precautions given for home.        Diagnosis:    ICD-10-CM    1. Intractable migraine without aura and without status migrainosus  G43.019      Scribe Disclosure:  ALEXI, Amrita Turner, am serving as a scribe at 11:12 AM on 6/29/2022 to document services personally performed by Angle Rosario MD based on my observations and the provider's statements to me.      Angle Rosario MD  06/29/22 3299

## 2022-06-29 NOTE — ED TRIAGE NOTES
Pt states that she has had a HA since Monday;  She gets HA but they don't normally last this long.  Some nausea noted as well as lightheaded.     Triage Assessment     Row Name 06/29/22 1032       Triage Assessment (Adult)    Airway WDL WDL       Respiratory WDL    Respiratory WDL WDL       Skin Circulation/Temperature WDL    Skin Circulation/Temperature WDL WDL       Cardiac WDL    Cardiac WDL WDL       Peripheral/Neurovascular WDL    Peripheral Neurovascular WDL WDL       Cognitive/Neuro/Behavioral WDL    Cognitive/Neuro/Behavioral WDL WDL

## 2022-06-29 NOTE — TELEPHONE ENCOUNTER
Work letter sent to patient via [employer].     Vandana Culp RN   Cardiology Care Coordinator  [facility]   Phone: [phone]  Fax: [phone]

## 2022-06-30 NOTE — TELEPHONE ENCOUNTER
LVM for Pt with direct phone number.  Informed Pt that the PT order is in and seems OK, not sure why her PT appt would not work well with the current order.  Asked her to call us back in case there is something additional.    - Markos Graham, ATC     Show Aperture Variable?: Yes Medical Necessity Information: It is in your best interest to select a reason for this procedure from the list below. All of these items fulfill various CMS LCD requirements except the new and changing color options. Spray Paint Technique: No Post-Care Instructions: I reviewed with the patient in detail post-care instructions. Patient is to wear sunprotection, and avoid picking at any of the treated lesions. Pt may apply Vaseline to crusted or scabbing areas. Detail Level: Detailed Number Of Freeze-Thaw Cycles: 2 freeze-thaw cycles Consent: The patient's consent was obtained including but not limited to risks of crusting, scabbing, blistering, scarring, darker or lighter pigmentary change, recurrence, incomplete removal and infection. Spray Paint Text: The liquid nitrogen was applied to the skin utilizing a spray paint frosting technique. Medical Necessity Clause: This procedure was medically necessary because the lesions that were treated were: Number Of Freeze-Thaw Cycles: 1 freeze-thaw cycle Detail Level: Zone Total Number Of Lesions Treated: 11

## 2022-07-21 ENCOUNTER — TELEPHONE (OUTPATIENT)
Dept: CARDIOLOGY | Facility: CLINIC | Age: 22
End: 2022-07-21

## 2022-07-21 NOTE — TELEPHONE ENCOUNTER
Records received from [facility],placed in scanning to be scanned into patient's chart.      BEHZAD Barajas   Cardiology Team  [facility]

## 2022-07-28 ENCOUNTER — TELEPHONE (OUTPATIENT)
Dept: INTERNAL MEDICINE | Facility: CLINIC | Age: 22
End: 2022-07-28

## 2022-07-28 NOTE — TELEPHONE ENCOUNTER
[facility] Call Center    Phone Message    May a detailed message be left on voicemail: yes     Reason for Call: Order(s): Home Care Orders: Physical Therapy (PT): Nadine was calling about the physical therapy referral, the original order was only for 25 visits and Nadine is needing to request for more. The orders needed: 2x per week for 12 weeks, please fax them to [phone] and please update the file with the health insurance thank you.    Action Taken: Message routed to:  [facility]    Travel Screening: Not Applicable

## 2022-07-29 ENCOUNTER — MEDICAL CORRESPONDENCE (OUTPATIENT)
Dept: INTERNAL MEDICINE | Facility: CLINIC | Age: 22
End: 2022-07-29

## 2022-07-29 ENCOUNTER — TELEPHONE (OUTPATIENT)
Dept: INTERNAL MEDICINE | Facility: CLINIC | Age: 22
End: 2022-07-29

## 2022-07-29 NOTE — TELEPHONE ENCOUNTER
[facility] Recommendation Form completed and faxed to [facility].      Dominick Bianchi CMA ([facility]) at 9:17 AM on 7/29/2022

## 2022-07-29 NOTE — TELEPHONE ENCOUNTER
Spoke to Nadine.  Service category code should be for PT and request start date service be May 31st, 2022.    Corrections made and faxed to Nadine and [facility] insurance.        Dominick Bianchi CMA ([facility]) at 10:45 AM on 7/29/2022

## 2022-07-29 NOTE — TELEPHONE ENCOUNTER
[facility] Call Center    Phone Message    May a detailed message be left on voicemail: yes     Reason for Call: Other: Other: Per call from Nadine, received an order for patient from [facility] but the service category is incorrect. Nadine requesting call back to correct it.       Action Taken: Message routed to:  [facility]    Travel Screening: Not Applicable

## 2022-11-19 ENCOUNTER — HEALTH MAINTENANCE LETTER (OUTPATIENT)
Age: 22
End: 2022-11-19

## 2023-02-09 NOTE — PATIENT INSTRUCTIONS
1. Diltiazem ointment 2% to be applied 3 times daily for 8 weeks  2. Fiber supplement such as Metamucil, Citrucel, or Benefiber once to twice a day  3. MiraLax or Milk of Magnesia if still constipated  4. Drink 10 glasses of water a day  5. Tylenol, ibuprofen, and warm tub baths/sitz baths for pain  6. Follow up in 8 weeks. Follow up sooner if symptoms do not improve after 4 weeks.     Patient discharged with written and verbal instructions and verbalized understanding. Patient ambulated out of ED in no signs of distress.

## 2023-02-22 ENCOUNTER — LAB (OUTPATIENT)
Dept: LAB | Facility: CLINIC | Age: 23
End: 2023-02-22
Payer: COMMERCIAL

## 2023-02-22 ENCOUNTER — TELEPHONE (OUTPATIENT)
Dept: INTERNAL MEDICINE | Facility: CLINIC | Age: 23
End: 2023-02-22

## 2023-02-22 ENCOUNTER — OFFICE VISIT (OUTPATIENT)
Dept: INTERNAL MEDICINE | Facility: CLINIC | Age: 23
End: 2023-02-22
Payer: COMMERCIAL

## 2023-02-22 VITALS
OXYGEN SATURATION: 95 % | DIASTOLIC BLOOD PRESSURE: 79 MMHG | WEIGHT: 127.3 LBS | HEIGHT: 70 IN | TEMPERATURE: 98.2 F | HEART RATE: 78 BPM | SYSTOLIC BLOOD PRESSURE: 122 MMHG | BODY MASS INDEX: 18.22 KG/M2

## 2023-02-22 DIAGNOSIS — R63.4 WEIGHT LOSS: ICD-10-CM

## 2023-02-22 DIAGNOSIS — R23.3 EASY BRUISING: ICD-10-CM

## 2023-02-22 DIAGNOSIS — K60.2 ANAL FISSURE: ICD-10-CM

## 2023-02-22 DIAGNOSIS — R63.4 WEIGHT LOSS: Primary | ICD-10-CM

## 2023-02-22 LAB
ALBUMIN SERPL BCG-MCNC: 4.3 G/DL (ref 3.5–5.2)
ALP SERPL-CCNC: 49 U/L (ref 35–104)
ALT SERPL W P-5'-P-CCNC: 16 U/L (ref 10–35)
ANION GAP SERPL CALCULATED.3IONS-SCNC: 9 MMOL/L (ref 7–15)
AST SERPL W P-5'-P-CCNC: 20 U/L (ref 10–35)
BASOPHILS # BLD AUTO: 0.1 10E3/UL (ref 0–0.2)
BASOPHILS NFR BLD AUTO: 1 %
BILIRUB SERPL-MCNC: 0.6 MG/DL
BUN SERPL-MCNC: 8.9 MG/DL (ref 6–20)
CALCIUM SERPL-MCNC: 9.2 MG/DL (ref 8.6–10)
CHLORIDE SERPL-SCNC: 104 MMOL/L (ref 98–107)
CREAT SERPL-MCNC: 0.7 MG/DL (ref 0.51–0.95)
DEPRECATED HCO3 PLAS-SCNC: 27 MMOL/L (ref 22–29)
EOSINOPHIL # BLD AUTO: 0.7 10E3/UL (ref 0–0.7)
EOSINOPHIL NFR BLD AUTO: 10 %
ERYTHROCYTE [DISTWIDTH] IN BLOOD BY AUTOMATED COUNT: 13.5 % (ref 10–15)
GFR SERPL CREATININE-BSD FRML MDRD: >90 ML/MIN/1.73M2
GLUCOSE SERPL-MCNC: 89 MG/DL (ref 70–99)
HCT VFR BLD AUTO: 44.2 % (ref 35–47)
HGB BLD-MCNC: 14 G/DL (ref 11.7–15.7)
IMM GRANULOCYTES # BLD: 0 10E3/UL
IMM GRANULOCYTES NFR BLD: 0 %
LYMPHOCYTES # BLD AUTO: 1.8 10E3/UL (ref 0.8–5.3)
LYMPHOCYTES NFR BLD AUTO: 25 %
MCH RBC QN AUTO: 27.1 PG (ref 26.5–33)
MCHC RBC AUTO-ENTMCNC: 31.7 G/DL (ref 31.5–36.5)
MCV RBC AUTO: 86 FL (ref 78–100)
MONOCYTES # BLD AUTO: 0.6 10E3/UL (ref 0–1.3)
MONOCYTES NFR BLD AUTO: 9 %
NEUTROPHILS # BLD AUTO: 4 10E3/UL (ref 1.6–8.3)
NEUTROPHILS NFR BLD AUTO: 55 %
NRBC # BLD AUTO: 0 10E3/UL
NRBC BLD AUTO-RTO: 0 /100
PLATELET # BLD AUTO: 311 10E3/UL (ref 150–450)
POTASSIUM SERPL-SCNC: 4.4 MMOL/L (ref 3.4–5.3)
PROT SERPL-MCNC: 7.1 G/DL (ref 6.4–8.3)
RBC # BLD AUTO: 5.16 10E6/UL (ref 3.8–5.2)
SODIUM SERPL-SCNC: 140 MMOL/L (ref 136–145)
TSH SERPL DL<=0.005 MIU/L-ACNC: 1.01 UIU/ML (ref 0.3–4.2)
WBC # BLD AUTO: 7.1 10E3/UL (ref 4–11)

## 2023-02-22 PROCEDURE — 99395 PREV VISIT EST AGE 18-39: CPT | Performed by: INTERNAL MEDICINE

## 2023-02-22 PROCEDURE — 99000 SPECIMEN HANDLING OFFICE-LAB: CPT | Performed by: PATHOLOGY

## 2023-02-22 PROCEDURE — 80050 GENERAL HEALTH PANEL: CPT | Performed by: PATHOLOGY

## 2023-02-22 PROCEDURE — 36415 COLL VENOUS BLD VENIPUNCTURE: CPT | Performed by: PATHOLOGY

## 2023-02-22 PROCEDURE — 82306 VITAMIN D 25 HYDROXY: CPT | Mod: 90 | Performed by: PATHOLOGY

## 2023-02-22 RX ORDER — DULOXETIN HYDROCHLORIDE 60 MG/1
60 CAPSULE, DELAYED RELEASE ORAL DAILY
COMMUNITY
End: 2023-05-05

## 2023-02-22 NOTE — TELEPHONE ENCOUNTER
[id number] - [place], [place] - [address]  diltiazem 2% in PLO gel 30 g 3 2/22/2023  --   Sig: Apply three times daily as needed       Resent to [facility], filled there previously

## 2023-02-22 NOTE — TELEPHONE ENCOUNTER
[facility] Call Center    Phone Message    May a detailed message be left on voicemail: yes     Reason for Call: Medication Question or concern regarding medication   Prescription Clarification  Name of Medication: diltiazem 2% in PLO gel     Prescribing Provider: Dr Kevin Henderson   Pharmacy:  [facility] in [place]   What on the order needs clarification? Medication is a compound drug and needs to be sent over to a compounding pharmacy.          Action Taken: Message routed to:  [facility]    Travel Screening: Not Applicable

## 2023-02-22 NOTE — NURSING NOTE
"Cyndi Grady is a 22 year old female patient that presents today in clinic for the following:    Chief Complaint   Patient presents with     Physical     Physical.  Discuss medication.  Discuss handicap slip.  Nutritionist referral.     The patient's allergies and medications were reviewed as noted. A set of vitals were recorded as noted without incident: /79 (BP Location: Right arm, Patient Position: Sitting, Cuff Size: Adult Small)   Pulse 78   Temp 98.2  F (36.8  C) (Oral)   Ht 1.778 m (5' 10\")   Wt 57.7 kg (127 lb 4.8 oz)   SpO2 95%   BMI 18.27 kg/m  . The patient does not have any other questions for the provider.    Justin Mena, EMT at 1:19 PM on 2/22/2023.  Primary care clinic: [phone]  "

## 2023-02-23 LAB — DEPRECATED CALCIDIOL+CALCIFEROL SERPL-MC: 19 UG/L (ref 20–75)

## 2023-02-24 ENCOUNTER — TELEPHONE (OUTPATIENT)
Dept: INTERNAL MEDICINE | Facility: CLINIC | Age: 23
End: 2023-02-24
Payer: COMMERCIAL

## 2023-02-24 ENCOUNTER — DOCUMENTATION ONLY (OUTPATIENT)
Dept: INTERNAL MEDICINE | Facility: CLINIC | Age: 23
End: 2023-02-24
Payer: COMMERCIAL

## 2023-02-24 NOTE — TELEPHONE ENCOUNTER
Nutrition Education Scheduling Outreach #1:    Call to patient to schedule. Left message with phone number to call to schedule.    Plan for 2nd outreach attempt within 1 week.    Ryan Del Cid OnCall  Diabetes and Nutrition Scheduling

## 2023-02-24 NOTE — PROGRESS NOTES
Type of Form Received: parking application    Form Received (Date) 2/22/23   Form Filled out Yes, date 2/24/23   Placed in provider folder Yes

## 2023-03-02 NOTE — PROGRESS NOTES
"Cyndi was seen today for physical.  She is a very pleasant 22 year old female with a past medical history of Anxiety, Depression (01/101/2013), Depressive disorder, POTS (postural orthostatic tachycardia syndrome) (01/2012), and Uncomplicated asthma.    She notes some loss of weight.  It has occurred slowly, losing since 2020 around 20 pounds. She restricts her diet quite a bit due to food allergies. She's interested in meeting with a nutritionist; I will also check some basic labs.  Also, feels that she bruises fairly easily.  Discussed how vitamin C is important for vascular integrity.  Will also check CBC with platelets.      She's hoping for a refill of a gel that she could use as needed for an anal fissure.    No changes to past, family, or social history and  ROS: 10 point ROS neg other than the symptoms noted above in the HPI.    PHYSICAL EXAM:  /79 (BP Location: Right arm, Patient Position: Sitting, Cuff Size: Adult Small)   Pulse 78   Temp 98.2  F (36.8  C) (Oral)   Ht 1.778 m (5' 10\")   Wt 57.7 kg (127 lb 4.8 oz)   SpO2 95%   BMI 18.27 kg/m    Constitutional: no distress, comfortable, pleasant   Eyes: anicteric, normal extra-ocular movements   Ears, Nose and Throat: tympanic membranes clear, neck supple with full range of motion, no thyromegaly.   Cardiovascular: regular rate and rhythm, normal S1 and S2, no murmurs, rubs or gallops, peripheral pulses full and symmetric   Respiratory: clear to auscultation, no wheezes or crackles, normal breath sounds   Gastrointestinal: positive bowel sounds, nontender, no hepatosplenomegaly, no masses   Musculoskeletal: knees full range of motion, no effusion  Skin: no concerning lesions, no jaundice   Neurological:  normal gait, no tremor   Psychological: appropriate mood   Lymphatic: no cervical lymphadenopathy and no pedal edema    A/P 22 year old here for routine physical    Weight loss  -     Nutrition Referral; Future  -     TSH with free T4 reflex; " Future  -     Vitamin D Deficiency; Future  -     Comprehensive metabolic panel (BMP + Alb, Alk Phos, ALT, AST, Total. Bili, TP); Future    Anal fissure  -     Diltiazem 2% in PLO gel; Apply three times daily as needed    Easy bruising  -     CBC with platelets differential; Future    Nicolasa Henderson MD

## 2023-03-09 ENCOUNTER — LAB REQUISITION (OUTPATIENT)
Dept: LAB | Facility: CLINIC | Age: 23
End: 2023-03-09

## 2023-03-09 DIAGNOSIS — Z11.3 ENCOUNTER FOR SCREENING FOR INFECTIONS WITH A PREDOMINANTLY SEXUAL MODE OF TRANSMISSION: ICD-10-CM

## 2023-03-09 PROCEDURE — 87389 HIV-1 AG W/HIV-1&-2 AB AG IA: CPT | Performed by: PHYSICIAN ASSISTANT

## 2023-03-09 PROCEDURE — 86803 HEPATITIS C AB TEST: CPT | Performed by: PHYSICIAN ASSISTANT

## 2023-03-09 PROCEDURE — 86780 TREPONEMA PALLIDUM: CPT | Performed by: PHYSICIAN ASSISTANT

## 2023-03-09 PROCEDURE — 87340 HEPATITIS B SURFACE AG IA: CPT | Performed by: PHYSICIAN ASSISTANT

## 2023-03-10 LAB
HBV SURFACE AG SERPL QL IA: NONREACTIVE
HCV AB SERPL QL IA: NONREACTIVE
HIV 1+2 AB+HIV1 P24 AG SERPL QL IA: NONREACTIVE
T PALLIDUM AB SER QL: NONREACTIVE

## 2023-03-13 DIAGNOSIS — E55.9 VITAMIN D DEFICIENCY: Primary | ICD-10-CM

## 2023-03-13 RX ORDER — ERGOCALCIFEROL 1.25 MG/1
50000 CAPSULE, LIQUID FILLED ORAL WEEKLY
Qty: 8 CAPSULE | Refills: 0 | Status: SHIPPED | OUTPATIENT
Start: 2023-03-13 | End: 2023-05-02

## 2023-03-23 ENCOUNTER — LAB REQUISITION (OUTPATIENT)
Dept: LAB | Facility: CLINIC | Age: 23
End: 2023-03-23

## 2023-03-23 DIAGNOSIS — A74.9 CHLAMYDIAL INFECTION, UNSPECIFIED: ICD-10-CM

## 2023-03-23 PROCEDURE — 87591 N.GONORRHOEAE DNA AMP PROB: CPT | Performed by: PHYSICIAN ASSISTANT

## 2023-03-24 LAB
C TRACH DNA SPEC QL PROBE+SIG AMP: NEGATIVE
N GONORRHOEA DNA SPEC QL NAA+PROBE: NEGATIVE

## 2023-03-28 ENCOUNTER — TELEPHONE (OUTPATIENT)
Dept: INTERNAL MEDICINE | Facility: CLINIC | Age: 23
End: 2023-03-28
Payer: COMMERCIAL

## 2023-03-28 NOTE — TELEPHONE ENCOUNTER
[facility] Call Center    Phone Message    May a detailed message be left on voicemail: yes     Reason for Call: Rosendo calling from [facility] and needs about 18 more referrals due to the patient being out of network. [facility]. Patient keeps going there 2x week or more. [facility] is not sure if Dr Kevin Henderson will keep approving the referrals? Please call to discuss further. However they will need the 18 more referrals if the provider is willing to authorize.    Action Taken: Message routed to:  [facility]    Travel Screening: Not Applicable                                                                         [phone]

## 2023-03-28 NOTE — TELEPHONE ENCOUNTER
RN left voice message with Rosendo [facility] asking for a call back to RNs direct number, so form needed can be discussed.    Romelia Lara RN on 3/28/2023 at 12:44 PM

## 2023-03-29 NOTE — TELEPHONE ENCOUNTER
RN faxed referral to [facility], and then received a call from Rosendo at [facility].  He will watch for the fax and review and if any additional details are needed, he will call RN back at direct number.    Romelia Lara RN on 3/29/2023 at 11:52 AM

## 2023-04-17 ENCOUNTER — HOSPITAL ENCOUNTER (OUTPATIENT)
Dept: NUTRITION | Facility: HOSPITAL | Age: 23
Discharge: HOME | End: 2023-04-17
Admitting: INTERNAL MEDICINE
Payer: COMMERCIAL

## 2023-04-17 DIAGNOSIS — R63.4 WEIGHT LOSS: ICD-10-CM

## 2023-04-17 PROCEDURE — 97802 MEDICAL NUTRITION INDIV IN: CPT | Mod: TEL,95 | Performed by: DIETITIAN, REGISTERED

## 2023-04-17 NOTE — PROGRESS NOTES
"[facility]  Medical Nutrition Therapy    Visit Type: Initial Assessment    Cyndi Grady, 23 year old referred by Kevin Henderson, Nicolasa Carter MD for MNT related to Weight loss    Virtual Visit Details    Type of service:  Telephone Visit   Phone call duration: 60 minutes       Nutrition Assessment:  Anthropometrics  Height:   Ht Readings from Last 1 Encounters:   02/22/23 1.778 m (5' 10\")         BMI:  18.3   Weight Status:  Underweight BMI <18.5   Weight:   Wt Readings from Last 1 Encounters:   02/22/23 57.7 kg (127 lb 4.8 oz)       UBW: 140-150 lb      IBW:  68 kg (150 lb) IBW %: 85%        Weight History:   Wt Readings from Last 20 Encounters:   02/22/23 57.7 kg (127 lb 4.8 oz)   06/29/22 59 kg (130 lb)   06/28/22 58.1 kg (128 lb)   03/16/22 57.8 kg (127 lb 6.4 oz)   02/09/22 59 kg (130 lb)   12/22/21 59 kg (130 lb)   09/09/21 59.3 kg (130 lb 11.7 oz)   08/16/21 63.5 kg (140 lb)   08/04/21 63.5 kg (140 lb)   04/21/21 64.6 kg (142 lb 8 oz)   04/18/21 65.8 kg (145 lb)   03/04/21 66.7 kg (147 lb)   02/22/21 68 kg (150 lb)   02/10/21 68 kg (150 lb)   01/04/21 65.1 kg (143 lb 8 oz)   09/22/20 62.1 kg (136 lb 12.8 oz)   11/20/19 62.7 kg (138 lb 3.2 oz) (67 %, Z= 0.44)*   09/25/19 61 kg (134 lb 6 oz) (62 %, Z= 0.30)*   09/15/19 61.2 kg (135 lb) (63 %, Z= 0.33)*   07/03/19 61.2 kg (135 lb) (64 %, Z= 0.35)*     * Growth percentiles are based on CDC (Girls, 2-20 Years) data.      -Wt loss of 23 lb over the past 2 years.   -Last year lost 20 lb within 2-3 weeks. Was at 140 lb and dropped down to 120 lb. Had GERD and difficulty swallowing. Also had a change in position at work - working as  and .   -Now GERD is improved. Isn't prohibiting from eating. Has found that acidic foods make GERD worse. Now can have tomatoes and fruits.   -Has been able to stay at 127 lb she believes. At her appointment 3 weeks ago was 127 lb.     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 Restricts diet due to food allergies.     Labs: reviewed      Meds:   Current Outpatient Medications   Medication Instructions     albuterol (PROAIR HFA/PROVENTIL HFA/VENTOLIN HFA) 108 (90 Base) MCG/ACT inhaler 2 puffs, Inhalation, EVERY 6 HOURS     benzoyl peroxide 5 % external liquid Use daily as directed. Use for face, chest, arms, back.     budesonide-formoterol (SYMBICORT) 80-4.5 MCG/ACT Inhaler Inhale 2 puffs by mouth twice daily     clindamycin (CLEOCIN T) 1 % external lotion Topical, DAILY     diltiazem 2% in PLO gel Apply three times daily as needed     DULoxetine (CYMBALTA) 60 mg, Oral, DAILY     EPINEPHrine (ANY BX GENERIC EQUIV) 0.3 mg, Intramuscular, ONCE PRN     hydrOXYzine (ATARAX) 25 mg, Oral, 3 TIMES DAILY PRN     loratadine-pseudoePHEDrine (CLARITIN-D 24-HOUR)  MG 24 hr tablet 1 tablet, Oral, DAILY, Reported on 3/16/2017     mometasone (NASONEX) 50 MCG/ACT nasal spray 2 sprays, Both Nostrils, DAILY     tretinoin (RETIN-A) 0.05 % external cream Topical, AT BEDTIME, Please keep 5-16-23 clinic appt for refills.     triamcinolone (KENALOG) 0.1 % external ointment Apply sparingly to affected area three times daily for 14 days.  Not to be used on the face.     vitamin D2 (ERGOCALCIFEROL) 50,000 Units, Oral, WEEKLY       Supplements: reviewed      Social/Environmental:   -average sleep per night:   -level of stress:       Food Record  Breakfast: Skips. Sometimes oatmeal or cereal (once per week).  Works a late schedule and sleeps in. Falls asleep at 2:00 am. 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   Lunch: Wakes up 12:00 pm. 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 Chronic pain and nausea daily. Used to eat much more before the GERD. Appetite has not been good.   -Has tried Zofran and it worked but hasn't used it lately. Used to take it as needed. Now hasn't taken it recently due to change in providers and used to dealing with it on her own. Tea helps soothe the nausea.   -Goes to physical therapy and does acupuncture. This has been helpful for managing pain and re-building muscle that was lost.     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 Also works 8-9 hours on Saturdays.      ASSESSED MALNUTRITION STATUS  % Weight Loss:  Weight loss does not meet criteria for malnutrition   % Intake:  </= 75% for >/= 1 month (severe malnutrition)  Subcutaneous Fat Loss:  Unable to determine  Loss of Muscle Mass:  Unable to determine  Fluid Retention:  None noted    Malnutrition Diagnosis:  Patient does not meet two of the above criteria necessary for diagnosing malnutrition        Nutrition Diagnosis:  Inadequate  protein-energy intake related to nausea and poor appetite as evidenced by report of nausea on a daily basis, loss of appetite, usual dietary intake meeting <75% estimated needs, BMI 18.3, and weight loss of 23 lb over the past 2 years.       Nutrition Intervention:  Nutrition Prescription Summary:  MNT to support weight gain, underweight     Nutrition Education (Content):  -Suggested eating 6 mini meals per day always including a protein and 2 other food groups  -Discussed protein options to include that do not include her allergens - try legumes (beans, lentils, soy - tofu, tempeh, edamame), sunbutter, zeeshan seeds, flaxseeds, sunflower seeds, soy milk, pea milk, peas including green and black-eyed peas, couscous, quinoa, meat, and fish.   -Suggested including extra fats with meals such as plant-based butter, olive oil, salad dressings, hummus, avocado etc.   -Suggested dairy free yogurt  -Recommended focusing on whole grains and including fruits and veggies each day as well  -Suggested drinking Gatorade and Powerade for extra calories plus electrolytes and sodium (POTS)      Emailed/mailed information discussed including nutrition handouts to patient.       Nutrition Prescription: Macronutrient and Micronutrient details   Dosing weight: Current wt (58 kg)  Energy: 2055-3375 kcals/day (30-35 Kcal/Kg)    Justification:  (underweight) Protein: 70-87 g Pro/day (1.2-1.5 g pro/Kg)    Justification:  (Repletion)   Fluid: 2628-3054 mL/day   (1 mL/Kcal)     Justification:  (underweight)   Fiber: 25 grams per day             Carbohydrate: 45-65% kcal          Fat: 20-35% kcal     Micronutrient: RDA  <2,300 mg sodium/day            Vitamin and Mineral Supplements: MVT+M       Patient Engagement:   Assessed learning needs and learning preference: Yes.  Teaching Method(s) used: Explanation    Nutrition Education (Application):  a)  Discussed current eating plans / recommended alternative food choices    b)  Patient verbalizes  understanding of diet by asking questions, participating in discussion     Anticipate >75% compliance   Stage of Change:  preparation      Nutrition Goals:  1) Eat 6 mini meals per day - always add a protein and 2 other food groups at the meal (dairy-alternatives, fruit, veggies, or whole grains)  2) Try Orgain nutritional supplements -11 oz 1-2 times per day   3) Drink Gatorade or Powerade throughout the day (not diet versions)  4) Add plant-based butter to meals whenever possible (on veggies, potatoes, pastas, sandwiches, etc.)  5) Add ground flaxseed to smoothies, pasta, oatmeal, yogurt, etc. Also try zeeshan seeds (try the energy bites recipe with dates, sunbutter, and zeeshan seeds)    Nutrition Follow Up / Monitoring:   Weight, PO intake, PA    Nutrition Recommendations:  Patient to follow-up with RD in 6 weeks.  Patient has RD contact information to call/email if needed.      Start time: 11:30  End time: 12:30    Total Time Duration: 60 min      Mireya Raymond MS, RD, LD, [facility]  Clinical Dietitian  [phone]

## 2023-05-05 ENCOUNTER — MYC REFILL (OUTPATIENT)
Dept: INTERNAL MEDICINE | Facility: CLINIC | Age: 23
End: 2023-05-05
Payer: COMMERCIAL

## 2023-05-05 DIAGNOSIS — F32.9 REACTIVE DEPRESSION: Primary | ICD-10-CM

## 2023-05-05 NOTE — TELEPHONE ENCOUNTER
DULoxetine (CYMBALTA) 60 MG capsule      Last Written Prescription Date:  historical    Last Office Visit : 2-22-23  Future Office visit:  none    Routing refill request to provider for review/approval because:  Medication is reported/historical

## 2023-05-09 RX ORDER — DULOXETIN HYDROCHLORIDE 60 MG/1
60 CAPSULE, DELAYED RELEASE ORAL DAILY
Qty: 30 CAPSULE | Refills: 11 | Status: SHIPPED | OUTPATIENT
Start: 2023-05-09

## 2023-05-15 NOTE — PROGRESS NOTES
[facility] Dermatology Note  Encounter Date: May 16, 2023  Office Visit     Dermatology Problem List:  # Pertinent PMH: POTS.  1. Acne vulgaris, inflammatory +/- hormonal.  - cautiously trial spironolactone starting 5/16/23  - tretinoin 0.05% cream nightly  - clindamcyin lotion  - BPO 5% wash daily in shower  - future: consider isotretinoin  ____________________________________________    Assessment & Plan:    # Acne vulgaris, inflammatory +/- hormonal component (has nexplanon).  Overall improved since last visit but not at goal. Given hormonal component, spironolactone would be a good treatment option. With patient's POTS and hypotensive episodes, we will start at low dose of 25mg daily, and if patient tolerates this, can increase to 50mg daily, or even 75mg or 100mg. Discussed potential side effects.   Patient agrees with this treatment.    - Continue tretinoin 0.05% cream nightly  - Continue clindamycin 1% lotion daily   - Continue BPO 5% wash daily  -Start spironolactone 25mg daily, then increase gradually as tolerated (maximum 100mg daily)  - Future: consider isotretinoin if worsening, patient prefers to try other options first    #dilated pore, lower right back  -Reassured patient of benign nature lesion, and that it does not require medical treatment.      Procedures Performed:   N/A    Follow-up: 3 month(s) in-person, or earlier for new or changing lesions    Staff and Scribe:     Scribe Disclosure:  I, Sumeet Hodges, am serving as a scribe to document services personally performed by Jo Ann Ham MD based on data collection and the provider's statements to me.     Marisol Carter, medical student, [facility]    Patient seen and staffed with Dr. Ham    Staff Physician:  I was present with the medical student who participated in the service and in the documentation of the note. I have verified the history and personally performed the physical exam and medical  decision making. I agree with the assessment and plan of care as documented in the note.       Jo Ann Ham MD    Department of Dermatology  [facility]: Phone: [phone], Fax: [phone]  5/18/2023    ____________________________________________    CC: Acne (Cyndi is here today for acne concerns. A lesion of concern on lower back. )    HPI:  Ms. Cyndi Grady is a(n) 23 year old female who presents today as a return patient for acne vulgaris. Last seen by me on 11/12/2020, at which time patient was started on tretinoin 0.05% cream, clindamycin 1% lotions, and BPO 5% wash for treatment of acne vulgaris.    Today, she says her acne has improved since last visit.  She has noticed the acne on her back has extended to cover more of her back but less inflammatory papules. Her face has overall been better, but continues to flare. She has noticed this may have a hormonal trigger. Currently still on nexplanon. She states there are no active lesions on her chest or arms, mostly scars/hyperpigmentation.    She has also noticed a spot on her lower back, which she picked at, and now has an indentation    Patient is otherwise feeling well, without additional skin concerns.    Labs Reviewed:  N/A    Physical Exam:  Vitals: There were no vitals taken for this visit.  SKIN: Focused examination of face, back was performed.  - scattered inflammatory papules, as well as open and closed comedones on the chin and back, with areas of hyperpigmentation.   - dilated pore on back  - No other lesions of concern on areas examined.     Medications:  Current Outpatient Medications   Medication     albuterol (PROAIR HFA/PROVENTIL HFA/VENTOLIN HFA) 108 (90 Base) MCG/ACT inhaler     benzoyl peroxide 5 % external liquid     budesonide-formoterol (SYMBICORT) 80-4.5 MCG/ACT Inhaler     clindamycin (CLEOCIN T) 1 % external lotion      diltiazem 2% in PLO gel     DULoxetine (CYMBALTA) 60 MG capsule     EPINEPHrine (ANY BX GENERIC EQUIV) 0.3 MG/0.3ML injection 2-pack     hydrOXYzine (ATARAX) 25 MG tablet     loratadine-pseudoePHEDrine (CLARITIN-D 24-HOUR)  MG 24 hr tablet     mometasone (NASONEX) 50 MCG/ACT nasal spray     spironolactone (ALDACTONE) 25 MG tablet     tretinoin (RETIN-A) 0.05 % external cream     triamcinolone (KENALOG) 0.1 % external ointment     No current facility-administered medications for this visit.      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 on close of this encounter.

## 2023-05-16 ENCOUNTER — OFFICE VISIT (OUTPATIENT)
Dept: DERMATOLOGY | Facility: CLINIC | Age: 23
End: 2023-05-16
Payer: COMMERCIAL

## 2023-05-16 DIAGNOSIS — D23.9 DILATED PORE OF WINER: ICD-10-CM

## 2023-05-16 DIAGNOSIS — L70.0 ACNE VULGARIS: Primary | ICD-10-CM

## 2023-05-16 PROCEDURE — 99214 OFFICE O/P EST MOD 30 MIN: CPT | Performed by: DERMATOLOGY

## 2023-05-16 RX ORDER — TRETINOIN 0.5 MG/G
CREAM TOPICAL AT BEDTIME
Qty: 45 G | Refills: 11 | Status: SHIPPED | OUTPATIENT
Start: 2023-05-16 | End: 2024-05-07

## 2023-05-16 RX ORDER — CLINDAMYCIN PHOSPHATE 10 UG/ML
LOTION TOPICAL DAILY
Qty: 60 ML | Refills: 11 | Status: SHIPPED | OUTPATIENT
Start: 2023-05-16

## 2023-05-16 RX ORDER — SPIRONOLACTONE 25 MG/1
TABLET ORAL
Qty: 120 TABLET | Refills: 3 | Status: SHIPPED | OUTPATIENT
Start: 2023-05-16 | End: 2024-05-07

## 2023-05-16 ASSESSMENT — PAIN SCALES - GENERAL: PAINLEVEL: NO PAIN (0)

## 2023-05-16 NOTE — NURSING NOTE
Dermatology Rooming Note    Cyndi Grady's goals for this visit include:   Chief Complaint   Patient presents with     Acne     Cyndi is here today for acne concerns. A lesion of concern on lower back.      Ricarda Cortez RN

## 2023-05-16 NOTE — LETTER
5/16/2023       RE: Cyndi Grady  [address]  [place] [address]     Dear Colleague,    Thank you for referring your patient, Cyndi Grady, to the [facility] DERMATOLOGY CLINIC [place] at [facility]. Please see a copy of my visit note below.    [facility] Dermatology Note  Encounter Date: May 16, 2023  Office Visit     Dermatology Problem List:  # Pertinent PMH: POTS.  1. Acne vulgaris, inflammatory +/- hormonal.  - cautiously trial spironolactone starting 5/16/23  - tretinoin 0.05% cream nightly  - clindamcyin lotion  - BPO 5% wash daily in shower  - future: consider isotretinoin  ____________________________________________    Assessment & Plan:    # Acne vulgaris, inflammatory +/- hormonal component (has nexplanon).  Overall improved since last visit but not at goal. Given hormonal component, spironolactone would be a good treatment option. With patient's POTS and hypotensive episodes, we will start at low dose of 25mg daily, and if patient tolerates this, can increase to 50mg daily, or even 75mg or 100mg. Discussed potential side effects.   Patient agrees with this treatment.    - Continue tretinoin 0.05% cream nightly  - Continue clindamycin 1% lotion daily   - Continue BPO 5% wash daily  -Start spironolactone 25mg daily, then increase gradually as tolerated (maximum 100mg daily)  - Future: consider isotretinoin if worsening, patient prefers to try other options first    #dilated pore, lower right back  -Reassured patient of benign nature lesion, and that it does not require medical treatment.      Procedures Performed:   N/A    Follow-up: 3 month(s) in-person, or earlier for new or changing lesions    Staff and Scribe:     Scribe Disclosure:  Sumeet TRUONG, am serving as a scribe to document services personally performed by Jo Ann Ham MD based on data collection and the provider's statements to  me.     Marisol Carter, medical student, [facility] [employer]    Patient seen and staffed with Dr. Ham    Staff Physician:  I was present with the medical student who participated in the service and in the documentation of the note. I have verified the history and personally performed the physical exam and medical decision making. I agree with the assessment and plan of care as documented in the note.       Jo Ann Ham MD    Department of Dermatology  [facility]: Phone: [phone], Fax: [phone]  5/18/2023    ____________________________________________    CC: Acne (Cyndi is here today for acne concerns. A lesion of concern on lower back. )    HPI:  Ms. Cyndi Grady is a(n) 23 year old female who presents today as a return patient for acne vulgaris. Last seen by me on 11/12/2020, at which time patient was started on tretinoin 0.05% cream, clindamycin 1% lotions, and BPO 5% wash for treatment of acne vulgaris.    Today, she says her acne has improved since last visit.  She has noticed the acne on her back has extended to cover more of her back but less inflammatory papules. Her face has overall been better, but continues to flare. She has noticed this may have a hormonal trigger. Currently still on nexplanon. She states there are no active lesions on her chest or arms, mostly scars/hyperpigmentation.    She has also noticed a spot on her lower back, which she picked at, and now has an indentation    Patient is otherwise feeling well, without additional skin concerns.    Labs Reviewed:  N/A    Physical Exam:  Vitals: There were no vitals taken for this visit.  SKIN: Focused examination of face, back was performed.  - scattered inflammatory papules, as well as open and closed comedones on the chin and back, with areas of hyperpigmentation.   - dilated pore on back  - No other lesions of concern on  areas examined.     Medications:  Current Outpatient Medications   Medication    albuterol (PROAIR HFA/PROVENTIL HFA/VENTOLIN HFA) 108 (90 Base) MCG/ACT inhaler    benzoyl peroxide 5 % external liquid    budesonide-formoterol (SYMBICORT) 80-4.5 MCG/ACT Inhaler    clindamycin (CLEOCIN T) 1 % external lotion    diltiazem 2% in PLO gel    DULoxetine (CYMBALTA) 60 MG capsule    EPINEPHrine (ANY BX GENERIC EQUIV) 0.3 MG/0.3ML injection 2-pack    hydrOXYzine (ATARAX) 25 MG tablet    loratadine-pseudoePHEDrine (CLARITIN-D 24-HOUR)  MG 24 hr tablet    mometasone (NASONEX) 50 MCG/ACT nasal spray    spironolactone (ALDACTONE) 25 MG tablet    tretinoin (RETIN-A) 0.05 % external cream    triamcinolone (KENALOG) 0.1 % external ointment     No current facility-administered medications for this visit.      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 on close of this encounter.

## 2023-05-16 NOTE — PATIENT INSTRUCTIONS
-Start spironolactone 25mg (1 pill) daily for about 1-2 weeks. If you can tolerate it, you can increase to 50mg ( 2 pills) for a couple of weeks. Can stay at 50mg or increase to 75mg daily (3 pills). Maximum 100mg daily (4 pills). You can take this in the morning or evening, does not need to be taken with food    -Continue tretinoin cream at night  -Continue clindamycin 1% lotion in the morning  -Continue BPO wash daily

## 2023-05-31 ENCOUNTER — HOSPITAL ENCOUNTER (OUTPATIENT)
Dept: NUTRITION | Facility: CLINIC | Age: 23
Discharge: HOME | End: 2023-05-31
Payer: COMMERCIAL

## 2023-05-31 PROCEDURE — 97803 MED NUTRITION INDIV SUBSEQ: CPT | Mod: TEL,95 | Performed by: DIETITIAN, REGISTERED

## 2023-05-31 NOTE — PROGRESS NOTES
"[facility]  Medical Nutrition Therapy     Visit Type: Re-Assessment     Cyndi Grady, 23 year old referred by Kevin Henderson, Nicolasa Carter MD for MNT related to Weight loss     Virtual Visit Details     Type of service:  Telephone Visit   Phone call duration:18 minutes         Nutrition Assessment:  Anthropometrics  Height:       Ht Readings from Last 1 Encounters:   02/22/23 1.778 m (5' 10\")           BMI:  18.3   Weight Status:  Underweight BMI <18.5   Weight:       Wt Readings from Last 1 Encounters:   02/22/23 57.7 kg (127 lb 4.8 oz)        UBW: 140-150 lb      IBW:  68 kg (150 lb) IBW %: 85%         Weight History:       Wt Readings from Last 20 Encounters:   02/22/23 57.7 kg (127 lb 4.8 oz)   06/29/22 59 kg (130 lb)   06/28/22 58.1 kg (128 lb)   03/16/22 57.8 kg (127 lb 6.4 oz)   02/09/22 59 kg (130 lb)   12/22/21 59 kg (130 lb)   09/09/21 59.3 kg (130 lb 11.7 oz)   08/16/21 63.5 kg (140 lb)   08/04/21 63.5 kg (140 lb)   04/21/21 64.6 kg (142 lb 8 oz)   04/18/21 65.8 kg (145 lb)   03/04/21 66.7 kg (147 lb)   02/22/21 68 kg (150 lb)   02/10/21 68 kg (150 lb)   01/04/21 65.1 kg (143 lb 8 oz)   09/22/20 62.1 kg (136 lb 12.8 oz)   11/20/19 62.7 kg (138 lb 3.2 oz) (67 %, Z= 0.44)*   09/25/19 61 kg (134 lb 6 oz) (62 %, Z= 0.30)*   09/15/19 61.2 kg (135 lb) (63 %, Z= 0.33)*   07/03/19 61.2 kg (135 lb) (64 %, Z= 0.35)*      * Growth percentiles are based on CDC (Girls, 2-20 Years) data.   -Patient is currently at 126 lb.   -Patient reports that it's been tough to increase her caloric intake over the past few weeks with work transitions. Stressful with changes and will be traveling soon. Also on antibiotics and had bad cramps and nausea and poor appetite. Now off the anti-biotics and feeling better. She has been trying to eat more protein based foods such as quinoa.     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 Restricts diet due to food allergies.      Last visit w/patient was on 23. Nutrition goals set at that time were the followin) Eat 6 mini meals per day - always add a protein and 2 other food groups at the meal (dairy-alternatives, fruit, veggies, or whole grains)  -Not met  2) Try Orgain nutritional supplements -11 oz 1-2 times per day   -Didn't try yet  3) Drink Gatorade or Powerade throughout the day (not diet versions)  -Has been drinking tropical juice, body armour, smoothies  4) Add plant-based butter to meals whenever possible (on veggies, potatoes, pastas, sandwiches, etc.)  -Hasn't tried it yet  5) Add ground flaxseed to smoothies, pasta, oatmeal, yogurt, etc. Also try zeeshan seeds (try the energy bites recipe with dates, sunbutter, and zeeshan seeds)  -Hasn't tried yet    -Patient plans to get to the grocery store soon to pick-up the recommended items.     Labs: reviewed        Meds: Reviewed    Supplements: reviewed        Food Record  Breakfast: Skips. Sometimes oatmeal or cereal such as Special K with lactose free whole milk (once per week). Works a late schedule and sleeps in. Falls asleep at 2:00 am. 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   Lunch: Wakes up 12:00 pm. Oatmeal pre-packaged with lactose-free milk or cereal. 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) or radha barth  3:00 snack: sandwich or burger or pasta   Dinner: 2 slices of pizza or melvin tots or fish fillets   Snacks: Snacks on pretzels, chips (1-2 cups), fruit snacks,  coconut yogurt   Beverages: Water 1-2 L per day, some soda 3 times per week, Lactaid, apple juice   -Take-out 5 per week- Mexican restaurant - sometimes with cheese and mostly without dairy    -Chicken breast with beans and rice     Nutritional Details:   -Food allergies:  tree nuts, peanuts, sesame oil  -Food intolerances:  -Food sensitivities: dairy (has indigestion and loose stools) and eggs (has a slight upset stomach if eats several eggs - fine with eggs as an ingredient)  -GI concerns: Nausea  -Appetite: Low  -Pace of eating:  -Role of cooking: self  -Role of food shopping: self        Physical Activity:  -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 Also works 8-9 hours on Saturdays.      ASSESSED MALNUTRITION STATUS  % Weight Loss:  Weight loss does not meet criteria for malnutrition   % Intake:  </= 75% for >/= 1 month (severe malnutrition)  Subcutaneous Fat Loss:  Unable to determine  Loss of Muscle Mass:  Unable to determine  Fluid Retention:  None noted     Malnutrition Diagnosis:  Patient does not meet two of the above criteria necessary for diagnosing malnutrition           Nutrition Diagnosis:  Inadequate protein-energy intake related to nausea and poor appetite as evidenced by report of nausea on a daily basis, loss of appetite, usual dietary intake meeting <75% estimated needs, BMI 18.3, and weight loss of 23 lb over the past 2 years.         Nutrition Intervention:  Nutrition Prescription Summary:  MNT to support weight gain, underweight      Nutrition Education (Content):  -Discussed goals set at last visit and barriers to reaching them  -Recommended that patient takes the list of new items to the grocery store next time she goes, to pick-up the new items.  -Discussed ways to fortify her foods for additional calories and protein      Emailed/mailed information discussed including nutrition handouts to patient.         Nutrition Prescription: Macronutrient and Micronutrient details   Dosing  weight: Current wt (58 kg)  Energy: 8196-1721 kcals/day (30-35 Kcal/Kg)     Justification:  (underweight) Protein: 70-87 g Pro/day (1.2-1.5 g pro/Kg)     Justification:  (Repletion)    Fluid: 1807-4865 mL/day   (1 mL/Kcal)     Justification:  (underweight)   Fiber: 25 grams per day              Carbohydrate: 45-65% kcal           Fat: 20-35% kcal      Micronutrient: RDA  <2,300 mg sodium/day                Vitamin and Mineral Supplements: MVT+M        Patient Engagement:   Assessed learning needs and learning preference: Yes.  Teaching Method(s) used: Explanation     Nutrition Education (Application):  a)  Discussed current eating plans / recommended alternative food choices     b)  Patient verbalizes understanding of diet by asking questions, participating in discussion      Anticipate >75% compliance   Stage of Change:  preparation        Nutrition Goals:  1) Eat 6 mini meals per day - always add a protein and 2 other food groups at the meal (dairy-alternatives, fruit, veggies, or whole grains)  2) Try Orgain nutritional supplements -11 oz 1-2 times per day   3) Drink Gatorade or Powerade throughout the day (not diet versions), or Naked juice drinks   4) Add plant-based butter to meals whenever possible (on veggies, potatoes, pastas, sandwiches, etc.)  5) Add ground flaxseed to smoothies, pasta, oatmeal, yogurt, etc. Also try zeeshan seeds (try the energy bites recipe with dates, sunbutter, and zeeshan seeds)     Nutrition Follow Up / Monitoring:   Weight, PO intake, PA     Nutrition Recommendations:  Patient to follow-up with RD in 6 weeks.  Patient has RD contact information to call/email if needed.        Start time: 13:00  End time: 13:20     Total Time Duration: 20 min        Mireya Raymond MS, RD, LD, [facility]  Clinical Dietitian  [phone]

## 2023-08-18 ENCOUNTER — OFFICE VISIT (OUTPATIENT)
Dept: DERMATOLOGY | Facility: CLINIC | Age: 23
End: 2023-08-18
Payer: COMMERCIAL

## 2023-08-18 DIAGNOSIS — L70.0 ACNE VULGARIS: Primary | ICD-10-CM

## 2023-08-18 PROCEDURE — 99214 OFFICE O/P EST MOD 30 MIN: CPT | Mod: GC | Performed by: DERMATOLOGY

## 2023-08-18 RX ORDER — TRETINOIN 0.5 MG/G
CREAM TOPICAL AT BEDTIME
Qty: 45 G | Refills: 1 | Status: SHIPPED | OUTPATIENT
Start: 2023-08-18 | End: 2024-05-07

## 2023-08-18 ASSESSMENT — PAIN SCALES - GENERAL: PAINLEVEL: NO PAIN (0)

## 2023-08-18 NOTE — LETTER
8/18/2023       RE: Cyndi Grady  [address]  [place] [address]     Dear Colleague,    Thank you for referring your patient, Cyndi Grady, to the [facility] DERMATOLOGY CLINIC [place] at [facility]. Please see a copy of my visit note below.    [facility] Dermatology Note  Encounter Date: August 18, 2023    Office Visit     Dermatology Problem List:  1. Acne vulgaris     ____________________________________________    Assessment & Plan:     # Acne vulgaris.  Reports improvement of her acne.  Happy with the current treatment.  Reports that the acne is typically worse in the winter and better in the summertime.  Just getting back from [place] where she had a lot of sun exposure and feels like this helped with the acne.  Did not start the spironolactone since she was on vacation and did not want anything to happen with her history of pots disease.  - Continue with the topical clindamycin, BPO wash, and tretinoin (0.025%)   - Sending in a stronger strength topical tretinoin (0.05%) to use when acne flares up     Procedures Performed:   None      Follow-up: 6 month(s) in-person, or earlier for new or changing lesions    Staff and Resident:     Maurisio Tobin PGY3  Attending Jo Ann Ham  ____________________________________________    CC: Patient presents with:  Acne: The patient reports improvement with current treatment. The patient reports less acne flares. Patient reports that they have not yet started the spironolactone.        HPI:  Cyndi herrera is a(n) 23 year old year old female  who presents today as a return patient for Acne    Patient is otherwise feeling well, without additional skin concerns.    Labs Reviewed:  N/A    Physical Exam:  Vitals: LMP 07/24/2023 (Approximate)   SKIN: Focused examination of Face was performed.  - There are superifical acneiform papules on the face predominantly on  the cheeks. Acneiform scarring is noted on the cheeks.   - No other lesions of concern on areas examined.     Medications:  Current Outpatient Medications   Medication    albuterol (PROAIR HFA/PROVENTIL HFA/VENTOLIN HFA) 108 (90 Base) MCG/ACT inhaler    benzoyl peroxide 5 % external liquid    budesonide-formoterol (SYMBICORT) 80-4.5 MCG/ACT Inhaler    clindamycin (CLEOCIN T) 1 % external lotion    diltiazem 2% in PLO gel    DULoxetine (CYMBALTA) 60 MG capsule    EPINEPHrine (ANY BX GENERIC EQUIV) 0.3 MG/0.3ML injection 2-pack    hydrOXYzine (ATARAX) 25 MG tablet    loratadine-pseudoePHEDrine (CLARITIN-D 24-HOUR)  MG 24 hr tablet    mometasone (NASONEX) 50 MCG/ACT nasal spray    spironolactone (ALDACTONE) 25 MG tablet    tretinoin (RETIN-A) 0.05 % external cream    tretinoin (RETIN-A) 0.05 % external cream    triamcinolone (KENALOG) 0.1 % external ointment     No current facility-administered medications for this visit.        Past Medical History:   Past Medical History:   Diagnosis Date    Anxiety     Depression 01/101/2013    Depressive disorder     POTS (postural orthostatic tachycardia syndrome) 01/2012    Uncomplicated asthma           CC Referred Self, MD  No address on file on close of this encounter.

## 2023-08-18 NOTE — PROGRESS NOTES
[facility] Dermatology Note  Encounter Date: August 18, 2023    Office Visit     Dermatology Problem List:  # Pertinent PMH: POTS.  1. Acne vulgaris, inflammatory +/- hormonal.  - tretinoin 0.05% or 0.1% cream nightly  - clindamcyin lotion  - BPO 5% wash daily in shower  - future: consider isotretinoin, spironolactone, cosmetic derm referral    ____________________________________________    Assessment & Plan:     # Acne vulgaris - chronic, active, improved.  Reports improvement of her acne.  Happy with the current treatment.  Reports that the acne is typically worse in the winter and better in the summertime.  Just getting back from [place] where she had a lot of sun exposure and feels like this helped with the acne.  Did not start the spironolactone since she was on vacation and did not want anything to happen with her history of pots disease. Seems to be doing okay without it. Will adjust tretinoin below.  - Increase tretinoin to 0.1% cream at bedtime as tolerated, ok to a/w tretinoin 0.05% as needed  - Continue clindamycin lotion daily  - Continue BP 5% wash daily; advised could also trial azelaic acid  - Future: consider spironolactone, isotretinoin, referral to Dr. Griffiths to consider lights/peels particularly given that she noted improvement in sunlight    Procedures Performed:   None      Follow-up: 6 month(s) in-person, or earlier for new or changing lesions    Staff and Resident:     Maurisio Tobin PGY3  Attending Jo Ann Ham    Staff Physician Comments:   I saw and evaluated the patient with the resident and I agree with the assessment and plan.  I was present for the examination.    Jo Ann Ham MD    Department of Dermatology  [facility]: Phone: [phone], Fax: [phone]  8/27/2023     ____________________________________________    CC: Patient presents with:  Acne: The patient  reports improvement with current treatment. The patient reports less acne flares. Patient reports that they have not yet started the spironolactone.        HPI:  Cyndi herrera is a(n) 23 year old year old female  who presents today as a return patient for Acne    Patient is otherwise feeling well, without additional skin concerns.    Labs Reviewed:  N/A    Physical Exam:  Vitals: LMP 07/24/2023 (Approximate)   SKIN: Focused examination of Face was performed.  - There are superifical acneiform papules on the face predominantly on the cheeks. Acneiform scarring is noted on the cheeks.   - No other lesions of concern on areas examined.     Medications:  Current Outpatient Medications   Medication    albuterol (PROAIR HFA/PROVENTIL HFA/VENTOLIN HFA) 108 (90 Base) MCG/ACT inhaler    benzoyl peroxide 5 % external liquid    budesonide-formoterol (SYMBICORT) 80-4.5 MCG/ACT Inhaler    clindamycin (CLEOCIN T) 1 % external lotion    diltiazem 2% in PLO gel    DULoxetine (CYMBALTA) 60 MG capsule    EPINEPHrine (ANY BX GENERIC EQUIV) 0.3 MG/0.3ML injection 2-pack    hydrOXYzine (ATARAX) 25 MG tablet    loratadine-pseudoePHEDrine (CLARITIN-D 24-HOUR)  MG 24 hr tablet    mometasone (NASONEX) 50 MCG/ACT nasal spray    spironolactone (ALDACTONE) 25 MG tablet    tretinoin (RETIN-A) 0.05 % external cream    tretinoin (RETIN-A) 0.05 % external cream    triamcinolone (KENALOG) 0.1 % external ointment     No current facility-administered medications for this visit.        Past Medical History:   Past Medical History:   Diagnosis Date    Anxiety     Depression 01/101/2013    Depressive disorder     POTS (postural orthostatic tachycardia syndrome) 01/2012    Uncomplicated asthma           CC Referred Self, MD  No address on file on close of this encounter.

## 2023-08-18 NOTE — NURSING NOTE
Dermatology Rooming Note    Cyndi Grady's goals for this visit include:   Chief Complaint   Patient presents with    Acne     The patient reports improvement with current treatment. The patient reports less acne flares.      Breann Whipple LPN

## 2023-08-27 RX ORDER — TRETINOIN 1 MG/G
CREAM TOPICAL
Qty: 45 G | Refills: 11 | Status: SHIPPED | OUTPATIENT
Start: 2023-08-27 | End: 2024-05-07

## 2023-08-28 NOTE — ADDENDUM NOTE
Addended by: LOUISA CHRISTIANSEN on: 8/27/2023 08:41 PM     Modules accepted: Orders, Level of Service

## 2023-08-29 ENCOUNTER — HOSPITAL ENCOUNTER (OUTPATIENT)
Dept: NUTRITION | Facility: CLINIC | Age: 23
Discharge: HOME | End: 2023-08-29
Attending: INTERNAL MEDICINE | Admitting: INTERNAL MEDICINE
Payer: COMMERCIAL

## 2023-08-29 PROCEDURE — 97803 MED NUTRITION INDIV SUBSEQ: CPT | Mod: TEL,95 | Performed by: DIETITIAN, REGISTERED

## 2023-08-29 NOTE — PROGRESS NOTES
"[facility]  Medical Nutrition Therapy     Visit Type: Re-Assessment     Cyndi Grady, 23 year old referred by Kevin Henderson, Nicolasa Carter MD for MNT related to Weight loss     Virtual Visit Details     Type of service:  Telephone Visit   Phone call duration:15 minutes         Nutrition Assessment:  Anthropometrics  Height:         Ht Readings from Last 1 Encounters:   02/22/23 1.778 m (5' 10\")           BMI:  18.3   Weight Status:  Underweight BMI <18.5   Weight:         Wt Readings from Last 1 Encounters:   02/22/23 57.7 kg (127 lb 4.8 oz)        UBW: 140-150 lb      IBW:  68 kg (150 lb) IBW %: 85%         Weight History:         Wt Readings from Last 20 Encounters:   02/22/23 57.7 kg (127 lb 4.8 oz)   06/29/22 59 kg (130 lb)   06/28/22 58.1 kg (128 lb)   03/16/22 57.8 kg (127 lb 6.4 oz)   02/09/22 59 kg (130 lb)   12/22/21 59 kg (130 lb)   09/09/21 59.3 kg (130 lb 11.7 oz)   08/16/21 63.5 kg (140 lb)   08/04/21 63.5 kg (140 lb)   04/21/21 64.6 kg (142 lb 8 oz)   04/18/21 65.8 kg (145 lb)   03/04/21 66.7 kg (147 lb)   02/22/21 68 kg (150 lb)   02/10/21 68 kg (150 lb)   01/04/21 65.1 kg (143 lb 8 oz)   09/22/20 62.1 kg (136 lb 12.8 oz)   11/20/19 62.7 kg (138 lb 3.2 oz) (67 %, Z= 0.44)*   09/25/19 61 kg (134 lb 6 oz) (62 %, Z= 0.30)*   09/15/19 61.2 kg (135 lb) (63 %, Z= 0.33)*   07/03/19 61.2 kg (135 lb) (64 %, Z= 0.35)*      * Growth percentiles are based on CDC (Girls, 2-20 Years) data.   -Patient was at 126 lb in May 2023.   -Patient reports that it's been tough to increase her caloric intake over the past few weeks with work transitions. Stressful with changes and will be traveling soon. Also on antibiotics and had bad cramps and nausea and poor appetite. Now off the anti-biotics and feeling better. She has been trying to eat more protein based foods such as quinoa.   -Gained 10 lb. Current wt is 136 lb on home scale. She is happy about this weight gain.     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 Restricts diet due to food allergies.      Last visit w/patient was on 23. Nutrition goals set at that time were the followin) Eat 6 mini meals per day - always add a protein and 2 other food groups at the meal (dairy-alternatives, fruit, veggies, or whole grains)  -Eating 3 snacks and 3 meals per day.   2) Try Orgain nutritional supplements -11 oz 1-2 times per day   -Hasn't tried them yet.   3) Drink Gatorade or Powerade throughout the day (not diet versions), or Naked juice drinks   -Drinking Naked juice drinks.   4) Add plant-based butter to meals whenever possible (on veggies, potatoes, pastas, sandwiches, etc.)  -Hasn't been using butter, uses olive oil   5) Add ground flaxseed to smoothies, pasta, oatmeal, yogurt, etc. Also try zeeshan seeds (try the energy bites recipe with dates, sunbutter, and zeeshan seeds)  -Has been eating oatmeal with flaxseed, made with milk    -Still wants to work more on prepping meals. Consuming more meals and snacks throughout the day.      Labs: reviewed        Meds: Reviewed     Supplements: reviewed        Food Record  Breakfast: Skips. Sometimes oatmeal or cereal such as Special K with lactose free whole milk (once per week). Works a late schedule and sleeps in. Falls asleep at 2:00 am. 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   Lunch: Wakes up 12:00 pm. Oatmeal pre-packaged with lactose-free milk or cereal. Two tacos from work ([employer])  or enchiladas, quesadillas  3:00 snack: sandwich or burger or pasta   Dinner: 2 slices of pizza or melvin tots or fish fillets   Snacks: Snacks on pretzels, chips (1-2 cups), fruit snacks, coconut yogurt   Beverages: Water 2 L per day, some soda 3 times per week, Lactaid, apple juice   -Take-out 5 per week- Mexican restaurant - sometimes with cheese and mostly without dairy    -Chicken breast with beans and rice     Nutritional Details:   -Food allergies:  tree nuts, peanuts, sesame oil  -Food intolerances:  -Food sensitivities: dairy (has indigestion and loose stools) and eggs (has a slight upset stomach if eats several eggs - fine with eggs as an ingredient)  -GI concerns: Nausea  -Appetite: Low  -Pace of eating:  -Role of cooking: self  -Role of food shopping: self        Physical Activity:  -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 Also works 8-9 hours on Saturdays.   -Does physical therapy.   -Went [place] some hiking trails and [employer] once in while as well.      ASSESSED MALNUTRITION STATUS  % Weight Loss:  Weight loss does not meet criteria for malnutrition   % Intake:  </= 75% for >/= 1 month (severe malnutrition)  Subcutaneous Fat Loss:  Unable to determine  Loss of Muscle Mass:  Unable to determine  Fluid Retention:  None noted     Malnutrition Diagnosis:  Patient does not meet two of the above criteria necessary for diagnosing malnutrition           Nutrition Diagnosis:  Inadequate protein-energy intake related to nausea and poor appetite as evidenced by report of nausea on a daily basis, loss of appetite, usual dietary intake meeting <75% estimated needs, BMI 18.3, and weight loss of 23 lb over the past 2 years.         Nutrition Intervention:  Nutrition Prescription Summary:  MNT to support weight gain, underweight      Nutrition Education (Content):  -Discussed goals set at last visit and barriers to reaching them  -Commended patient for the weight gain.   -Recommended strategies for meal  prepping     Emailed/mailed information discussed including nutrition handouts to patient.         Nutrition Prescription: Macronutrient and Micronutrient details   Dosing weight: Current wt (58 kg)  Energy: 1042-0633 kcals/day (30-35 Kcal/Kg)     Justification:  (underweight) Protein: 70-87 g Pro/day (1.2-1.5 g pro/Kg)     Justification:  (Repletion)    Fluid: 5128-8103 mL/day   (1 mL/Kcal)     Justification:  (underweight)   Fiber: 25 grams per day              Carbohydrate: 45-65% kcal           Fat: 20-35% kcal      Micronutrient: RDA  <2,300 mg sodium/day                Vitamin and Mineral Supplements: MVT+M        Patient Engagement:   Assessed learning needs and learning preference: Yes.  Teaching Method(s) used: Explanation     Nutrition Education (Application):  a)  Discussed current eating plans / recommended alternative food choices     b)  Patient verbalizes understanding of diet by asking questions, participating in discussion      Anticipate >75% compliance   Stage of Change:  preparation        Nutrition Goals:  1) Eat 6 mini meals per day - always add a protein and 2 other food groups at the meal (dairy-alternatives, fruit, veggies, or whole grains)  2) Try Orgain nutritional supplements -11 oz 1-2 times per day   3) Drink Gatorade or Powerade throughout the day (not diet versions), or Naked juice drinks   4) Add plant-based butter to meals whenever possible (on veggies, potatoes, pastas, sandwiches, etc.)  5) Add ground flaxseed to smoothies, pasta, oatmeal, yogurt, etc. Also try zeeshan seeds (try the energy bites recipe with dates, sunbutter, and zeeshan seeds)     Nutrition Follow Up / Monitoring:   Weight, PO intake, PA     Nutrition Recommendations:  Patient to follow-up with RD in 6 weeks.  Patient has RD contact information to call/email if needed.        Start time: 13:30  End time: 13:45     Total Time Duration: 15 min        Mireya Raymond MS, RD, LD, [facility]  Clinical Dietitian  [phone]

## 2023-09-29 ENCOUNTER — TELEPHONE (OUTPATIENT)
Dept: INTERNAL MEDICINE | Facility: CLINIC | Age: 23
End: 2023-09-29
Payer: COMMERCIAL

## 2023-09-29 DIAGNOSIS — M25.562 LEFT KNEE PAIN, UNSPECIFIED CHRONICITY: Primary | ICD-10-CM

## 2023-09-29 NOTE — TELEPHONE ENCOUNTER
[facility] Call Center    Phone Message    May a detailed message be left on voicemail: yes     Reason for Call: Other: Please extend physical therapy orders as there is a limit on how many times patient can be seen in physical therapy     Action Taken: Other: PCC    Travel Screening: Not Applicable

## 2023-10-02 NOTE — CONFIDENTIAL NOTE
Please call mother back regarding referral request for [facility], needs to go thru March 2024, please assist thank you

## 2023-10-03 NOTE — TELEPHONE ENCOUNTER
PT referral to [facility] order signed by provider today.        Dominick Bianchi CMA ([facility]) at 10:28 AM on 10/3/2023

## 2023-11-08 ENCOUNTER — LAB REQUISITION (OUTPATIENT)
Dept: LAB | Facility: CLINIC | Age: 23
End: 2023-11-08

## 2023-11-08 DIAGNOSIS — Z11.3 ENCOUNTER FOR SCREENING FOR INFECTIONS WITH A PREDOMINANTLY SEXUAL MODE OF TRANSMISSION: ICD-10-CM

## 2023-11-08 PROCEDURE — 86803 HEPATITIS C AB TEST: CPT | Performed by: PHYSICIAN ASSISTANT

## 2023-11-08 PROCEDURE — 87340 HEPATITIS B SURFACE AG IA: CPT | Performed by: PHYSICIAN ASSISTANT

## 2023-11-08 PROCEDURE — 86780 TREPONEMA PALLIDUM: CPT | Performed by: PHYSICIAN ASSISTANT

## 2023-11-08 PROCEDURE — 87389 HIV-1 AG W/HIV-1&-2 AB AG IA: CPT | Performed by: PHYSICIAN ASSISTANT

## 2023-12-05 ENCOUNTER — TELEPHONE (OUTPATIENT)
Dept: DERMATOLOGY | Facility: CLINIC | Age: 23
End: 2023-12-05
Payer: COMMERCIAL

## 2023-12-05 ENCOUNTER — TELEPHONE (OUTPATIENT)
Dept: INTERNAL MEDICINE | Facility: CLINIC | Age: 23
End: 2023-12-05
Payer: COMMERCIAL

## 2023-12-05 NOTE — TELEPHONE ENCOUNTER
[facility] Call Center    Phone Message    May a detailed message be left on voicemail: yes     Reason for Call: Order(s): Home Care Orders:   Reason for requested: PT orders required - 2X per week  (duration per provider 25-99 visits)  Date needed: asap  Provider name: Dr Kevin Henderson    Action Taken: Other: pcc    Travel Screening: Not Applicable

## 2023-12-06 NOTE — TELEPHONE ENCOUNTER
[facility] referral filled out and faxed to [phone] for [facility].

## 2023-12-07 NOTE — TELEPHONE ENCOUNTER
Nadine calling stating they did not receive the order, can you fax them to [employer] at [phone]. Please call and advise.

## 2023-12-08 ENCOUNTER — TELEPHONE (OUTPATIENT)
Dept: DERMATOLOGY | Facility: CLINIC | Age: 23
End: 2023-12-08
Payer: COMMERCIAL

## 2023-12-08 NOTE — TELEPHONE ENCOUNTER
Lvm sent [facility] msg for patient to reschedule the following:    Appointment type: Return  Provider: Fatoumata Tolbert  Return date:03-15-24  Specialty phone number: [phone]

## 2024-01-08 ENCOUNTER — TELEPHONE (OUTPATIENT)
Dept: NURSING | Facility: CLINIC | Age: 24
End: 2024-01-08
Payer: COMMERCIAL

## 2024-01-08 NOTE — TELEPHONE ENCOUNTER
"Patient/family was instructed to return call to [facility] RN directly on the RN Call Back Line at [phone]. Attempted to reach Cyndi regarding the message below.          Mother calls clinic regarding a \"follow up question\" for Dr. Zuniga. Cyndi has chronic pots syndrome, mother would like to ask Dr. Zuniga for a good recommendation for Cyndi. Call back number is [phone]. Informed mother we would need to get Cyndi's permission to have Dr. Zuniga discuss healthcare information/plans with mother. Mother states Cyndi's number is   [phone], contact after later afternoon. Try to call at 2pm. Will call later, and then route to Dr. Zuniga.     Call placed to Cyndi, cyndi gave verbal permission for Dr. Zuniga and mother to discuss her healthcare information and recommendations going forward. Routed to Dr. Zuniga.           "

## 2024-01-10 NOTE — TELEPHONE ENCOUNTER
I called mother back and discussed with her possible resources for Cyndi moving forward. I shared that Cyndi needs to establish care with an adult primary care provider (either a Family Medicine doctor or Internist), and then get a referral to a program that treats POTS.  Mother also shared that she found an Integrative health clinic at [facility] that she is going to call for an appointment for Cyndi as well.  I let mother know that if she has any further questions that she thinks I might be able to help with, she could call us back.

## 2024-01-12 ENCOUNTER — HOSPITAL ENCOUNTER (EMERGENCY)
Facility: CLINIC | Age: 24
Discharge: HOME | End: 2024-01-12
Attending: EMERGENCY MEDICINE | Admitting: EMERGENCY MEDICINE
Payer: COMMERCIAL

## 2024-01-12 ENCOUNTER — APPOINTMENT (OUTPATIENT)
Dept: GENERAL RADIOLOGY | Facility: CLINIC | Age: 24
End: 2024-01-12
Attending: EMERGENCY MEDICINE
Payer: COMMERCIAL

## 2024-01-12 VITALS
OXYGEN SATURATION: 100 % | SYSTOLIC BLOOD PRESSURE: 116 MMHG | WEIGHT: 130 LBS | DIASTOLIC BLOOD PRESSURE: 64 MMHG | HEART RATE: 66 BPM | TEMPERATURE: 97 F | BODY MASS INDEX: 18.65 KG/M2 | RESPIRATION RATE: 16 BRPM

## 2024-01-12 DIAGNOSIS — S62.663A CLOSED NONDISPLACED FRACTURE OF DISTAL PHALANX OF LEFT MIDDLE FINGER, INITIAL ENCOUNTER: ICD-10-CM

## 2024-01-12 DIAGNOSIS — S30.1XXA CONTUSION OF ABDOMINAL WALL, INITIAL ENCOUNTER: ICD-10-CM

## 2024-01-12 PROCEDURE — 73130 X-RAY EXAM OF HAND: CPT | Mod: LT

## 2024-01-12 PROCEDURE — 99284 EMERGENCY DEPT VISIT MOD MDM: CPT | Mod: 25

## 2024-01-12 PROCEDURE — 26750 TREAT FINGER FRACTURE EACH: CPT | Mod: F2

## 2024-01-12 ASSESSMENT — ACTIVITIES OF DAILY LIVING (ADL): ADLS_ACUITY_SCORE: 35
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
## 2024-02-01 ENCOUNTER — TELEPHONE (OUTPATIENT)
Dept: FAMILY MEDICINE | Facility: CLINIC | Age: 24
End: 2024-02-01
Payer: COMMERCIAL

## 2024-02-01 NOTE — TELEPHONE ENCOUNTER
Reason for Call:  Appointment Request    Patient requesting this type of appt:  Est care     Requested provider:  Kristal Kyle     Reason patient unable to be scheduled: Not within requested timeframe    When does patient want to be seen/preferred time:  Open     Comments: Looking to get in to EST care with dr Giraldo     Could we send this information to you in [facility] or would you prefer to receive a phone call?:   Patient would prefer a phone call   Okay to leave a detailed message?: Yes at Home number on file [phone] (home)    Call taken on 2/1/2024 at 4:57 PM by Debby Peace

## 2024-04-02 ENCOUNTER — LAB REQUISITION (OUTPATIENT)
Dept: LAB | Facility: CLINIC | Age: 24
End: 2024-04-02

## 2024-04-02 DIAGNOSIS — Z11.3 ENCOUNTER FOR SCREENING FOR INFECTIONS WITH A PREDOMINANTLY SEXUAL MODE OF TRANSMISSION: ICD-10-CM

## 2024-04-02 DIAGNOSIS — Z13.0 ENCOUNTER FOR SCREENING FOR DISEASES OF THE BLOOD AND BLOOD-FORMING ORGANS AND CERTAIN DISORDERS INVOLVING THE IMMUNE MECHANISM: ICD-10-CM

## 2024-04-02 DIAGNOSIS — Z13.220 ENCOUNTER FOR SCREENING FOR LIPOID DISORDERS: ICD-10-CM

## 2024-04-02 LAB
CHOLEST SERPL-MCNC: 158 MG/DL
ERYTHROCYTE [DISTWIDTH] IN BLOOD BY AUTOMATED COUNT: 13.7 % (ref 10–15)
FASTING STATUS PATIENT QL REPORTED: NO
HBV SURFACE AG SERPL QL IA: NONREACTIVE
HCT VFR BLD AUTO: 44.2 % (ref 35–47)
HCV AB SERPL QL IA: NONREACTIVE
HDLC SERPL-MCNC: 80 MG/DL
HGB BLD-MCNC: 14.1 G/DL (ref 11.7–15.7)
HIV 1+2 AB+HIV1 P24 AG SERPL QL IA: NONREACTIVE
LDLC SERPL CALC-MCNC: 69 MG/DL
MCH RBC QN AUTO: 28 PG (ref 26.5–33)
MCHC RBC AUTO-ENTMCNC: 31.9 G/DL (ref 31.5–36.5)
MCV RBC AUTO: 88 FL (ref 78–100)
NONHDLC SERPL-MCNC: 78 MG/DL
PLATELET # BLD AUTO: 260 10E3/UL (ref 150–450)
RBC # BLD AUTO: 5.04 10E6/UL (ref 3.8–5.2)
TRIGL SERPL-MCNC: 45 MG/DL
WBC # BLD AUTO: 6.4 10E3/UL (ref 4–11)

## 2024-04-02 PROCEDURE — 86803 HEPATITIS C AB TEST: CPT | Performed by: PHYSICIAN ASSISTANT

## 2024-04-02 PROCEDURE — 86780 TREPONEMA PALLIDUM: CPT | Performed by: PHYSICIAN ASSISTANT

## 2024-04-02 PROCEDURE — 87389 HIV-1 AG W/HIV-1&-2 AB AG IA: CPT | Performed by: PHYSICIAN ASSISTANT

## 2024-04-02 PROCEDURE — 80061 LIPID PANEL: CPT | Performed by: PHYSICIAN ASSISTANT

## 2024-04-02 PROCEDURE — 87340 HEPATITIS B SURFACE AG IA: CPT | Performed by: PHYSICIAN ASSISTANT

## 2024-04-02 PROCEDURE — 85014 HEMATOCRIT: CPT | Performed by: PHYSICIAN ASSISTANT

## 2024-04-03 LAB — T PALLIDUM AB SER QL: NONREACTIVE

## 2024-04-16 ENCOUNTER — OFFICE VISIT (OUTPATIENT)
Dept: INTERNAL MEDICINE | Facility: CLINIC | Age: 24
End: 2024-04-16
Payer: COMMERCIAL

## 2024-04-16 VITALS
HEART RATE: 67 BPM | OXYGEN SATURATION: 100 % | DIASTOLIC BLOOD PRESSURE: 79 MMHG | WEIGHT: 131.1 LBS | HEIGHT: 70 IN | SYSTOLIC BLOOD PRESSURE: 115 MMHG | BODY MASS INDEX: 18.77 KG/M2

## 2024-04-16 DIAGNOSIS — G90.A POTS (POSTURAL ORTHOSTATIC TACHYCARDIA SYNDROME): Primary | ICD-10-CM

## 2024-04-16 PROCEDURE — 99213 OFFICE O/P EST LOW 20 MIN: CPT | Mod: GE

## 2024-04-16 ASSESSMENT — ASTHMA QUESTIONNAIRES
QUESTION_5 LAST FOUR WEEKS HOW WOULD YOU RATE YOUR ASTHMA CONTROL: WELL CONTROLLED
QUESTION_3 LAST FOUR WEEKS HOW OFTEN DID YOUR ASTHMA SYMPTOMS (WHEEZING, COUGHING, SHORTNESS OF BREATH, CHEST TIGHTNESS OR PAIN) WAKE YOU UP AT NIGHT OR EARLIER THAN USUAL IN THE MORNING: ONCE OR TWICE
QUESTION_2 LAST FOUR WEEKS HOW OFTEN HAVE YOU HAD SHORTNESS OF BREATH: ONCE OR TWICE A WEEK
ACT_TOTALSCORE: 20
QUESTION_4 LAST FOUR WEEKS HOW OFTEN HAVE YOU USED YOUR RESCUE INHALER OR NEBULIZER MEDICATION (SUCH AS ALBUTEROL): ONCE A WEEK OR LESS
QUESTION_1 LAST FOUR WEEKS HOW MUCH OF THE TIME DID YOUR ASTHMA KEEP YOU FROM GETTING AS MUCH DONE AT WORK, SCHOOL OR AT HOME: A LITTLE OF THE TIME
ACT_TOTALSCORE: 20

## 2024-04-16 ASSESSMENT — PATIENT HEALTH QUESTIONNAIRE - PHQ9
SUM OF ALL RESPONSES TO PHQ QUESTIONS 1-9: 8
10. IF YOU CHECKED OFF ANY PROBLEMS, HOW DIFFICULT HAVE THESE PROBLEMS MADE IT FOR YOU TO DO YOUR WORK, TAKE CARE OF THINGS AT HOME, OR GET ALONG WITH OTHER PEOPLE: VERY DIFFICULT
SUM OF ALL RESPONSES TO PHQ QUESTIONS 1-9: 8

## 2024-04-16 NOTE — PROGRESS NOTES
Assessment & Plan   Cyndi Grady is a 24 year old female with PMHx of POTS, asthma, and anxiety/depression who presents to the clinic for PT referral. Her mother is present for today's visit.    # POTS (postural orthostatic tachycardia syndrome)  Patient has been going to physical therapy twice weekly for POTS, which provides significant relief. Requesting updated referral.  - Physical Therapy  Referral; Future    Kay Goodwin DO  Internal Medicine PGY-1  [facility]  04/16/24     Vicente Hanna is a 24 year old, presenting for the following health issues:  RECHECK ([facility] physical [facility]      4/16/2024     2:05 PM   Additional Questions   Roomed by KTE   Accompanied by Mother         4/16/2024   Forms   Any forms needing to be completed Yes     History of Present Illness       Reason for visit:  Pt referral  Symptom onset:  More than a month  Symptoms include:  POTS  Symptom intensity:  Severe  Symptom progression:  Staying the same  Had these symptoms before:  Yes  Has tried/received treatment for these symptoms:  Yes  Previous treatment was successful:  Yes    She eats 2-3 servings of fruits and vegetables daily.She consumes 2 sweetened beverage(s) daily.She exercises with enough effort to increase her heart rate 9 or less minutes per day.  She exercises with enough effort to increase her heart rate 3 or less days per week.   She is taking medications regularly.    Cyndi Grady is a 24 year old female with PMHx of POTS, asthma, and anxiety/depression who presents to the clinic for PT referral. Her mother is present for today's visit.    She has POTS - symptoms include nausea, headaches, blurry/loss of vision, diffuse joint and body pains. Occur almost daily, very debilitating. She goes to specialized PT twice a week and this helps with her symptoms. Needs new PT referral so she can continue this. At this  time, she does not wish to pursue any other healthcare maintenance items.      Objective    There were no vitals taken for this visit.  There is no height or weight on file to calculate BMI.  Physical Exam   General: Alert and oriented without distress  HEENT: NCAT, anicteric sclera  Respiratory: CTAB, no wheezes or crackles appreciated. No use of accessory muscles.  Cardiovascular: RRR without murmurs, rubs or gallop.   Skin: No edema to lower extremities. 2+ pulses palpable        Signed Electronically by: Kay Goodwin DO

## 2024-05-07 ENCOUNTER — TELEPHONE (OUTPATIENT)
Dept: INTERNAL MEDICINE | Facility: CLINIC | Age: 24
End: 2024-05-07

## 2024-05-07 ENCOUNTER — OFFICE VISIT (OUTPATIENT)
Dept: INTERNAL MEDICINE | Facility: CLINIC | Age: 24
End: 2024-05-07
Payer: COMMERCIAL

## 2024-05-07 VITALS
TEMPERATURE: 98.2 F | DIASTOLIC BLOOD PRESSURE: 78 MMHG | BODY MASS INDEX: 18.85 KG/M2 | OXYGEN SATURATION: 100 % | HEIGHT: 70 IN | HEART RATE: 65 BPM | WEIGHT: 131.7 LBS | SYSTOLIC BLOOD PRESSURE: 115 MMHG

## 2024-05-07 DIAGNOSIS — L20.9 ATOPIC DERMATITIS, UNSPECIFIED TYPE: ICD-10-CM

## 2024-05-07 DIAGNOSIS — K60.2 ANAL FISSURE: ICD-10-CM

## 2024-05-07 DIAGNOSIS — N76.0 VAGINITIS AND VULVOVAGINITIS: ICD-10-CM

## 2024-05-07 DIAGNOSIS — G90.A POTS (POSTURAL ORTHOSTATIC TACHYCARDIA SYNDROME): Primary | ICD-10-CM

## 2024-05-07 DIAGNOSIS — M79.10 MYALGIA: ICD-10-CM

## 2024-05-07 DIAGNOSIS — J45.40 MODERATE PERSISTENT ASTHMA WITHOUT COMPLICATION: ICD-10-CM

## 2024-05-07 DIAGNOSIS — A74.9 CHLAMYDIA INFECTION: ICD-10-CM

## 2024-05-07 DIAGNOSIS — Z11.3 SCREEN FOR STD (SEXUALLY TRANSMITTED DISEASE): ICD-10-CM

## 2024-05-07 PROBLEM — K21.9 GASTROESOPHAGEAL REFLUX DISEASE: Status: ACTIVE | Noted: 2021-07-15

## 2024-05-07 LAB
CLUE CELLS: ABNORMAL
HCV AB SERPL QL IA: NONREACTIVE
T PALLIDUM AB SER QL: NONREACTIVE
TRICHOMONAS, WET PREP: ABNORMAL
WBC'S/HIGH POWER FIELD, WET PREP: ABNORMAL
YEAST, WET PREP: ABNORMAL

## 2024-05-07 PROCEDURE — 87389 HIV-1 AG W/HIV-1&-2 AB AG IA: CPT | Performed by: INTERNAL MEDICINE

## 2024-05-07 PROCEDURE — 87210 SMEAR WET MOUNT SALINE/INK: CPT | Performed by: INTERNAL MEDICINE

## 2024-05-07 PROCEDURE — 87591 N.GONORRHOEAE DNA AMP PROB: CPT | Performed by: INTERNAL MEDICINE

## 2024-05-07 PROCEDURE — 90471 IMMUNIZATION ADMIN: CPT | Performed by: INTERNAL MEDICINE

## 2024-05-07 PROCEDURE — 86780 TREPONEMA PALLIDUM: CPT | Performed by: INTERNAL MEDICINE

## 2024-05-07 PROCEDURE — 87491 CHLMYD TRACH DNA AMP PROBE: CPT | Performed by: INTERNAL MEDICINE

## 2024-05-07 PROCEDURE — 90715 TDAP VACCINE 7 YRS/> IM: CPT | Performed by: INTERNAL MEDICINE

## 2024-05-07 PROCEDURE — 36415 COLL VENOUS BLD VENIPUNCTURE: CPT | Performed by: INTERNAL MEDICINE

## 2024-05-07 PROCEDURE — 99214 OFFICE O/P EST MOD 30 MIN: CPT | Mod: 25 | Performed by: INTERNAL MEDICINE

## 2024-05-07 PROCEDURE — 86803 HEPATITIS C AB TEST: CPT | Performed by: INTERNAL MEDICINE

## 2024-05-07 RX ORDER — ETONOGESTREL 68 MG/1
1 IMPLANT SUBCUTANEOUS ONCE
COMMUNITY
Start: 2023-03-09

## 2024-05-07 RX ORDER — TRIAMCINOLONE ACETONIDE 1 MG/G
OINTMENT TOPICAL
Qty: 15 G | Refills: 3 | Status: SHIPPED | OUTPATIENT
Start: 2024-05-07

## 2024-05-07 RX ORDER — TRETINOIN 1 MG/G
CREAM TOPICAL AT BEDTIME
COMMUNITY
Start: 2024-05-07

## 2024-05-07 SDOH — HEALTH STABILITY: PHYSICAL HEALTH: ON AVERAGE, HOW MANY DAYS PER WEEK DO YOU ENGAGE IN MODERATE TO STRENUOUS EXERCISE (LIKE A BRISK WALK)?: 2 DAYS

## 2024-05-07 SDOH — HEALTH STABILITY: PHYSICAL HEALTH: ON AVERAGE, HOW MANY MINUTES DO YOU ENGAGE IN EXERCISE AT THIS LEVEL?: 40 MIN

## 2024-05-07 ASSESSMENT — ASTHMA QUESTIONNAIRES
QUESTION_3 LAST FOUR WEEKS HOW OFTEN DID YOUR ASTHMA SYMPTOMS (WHEEZING, COUGHING, SHORTNESS OF BREATH, CHEST TIGHTNESS OR PAIN) WAKE YOU UP AT NIGHT OR EARLIER THAN USUAL IN THE MORNING: ONCE OR TWICE
QUESTION_2 LAST FOUR WEEKS HOW OFTEN HAVE YOU HAD SHORTNESS OF BREATH: ONCE OR TWICE A WEEK
QUESTION_5 LAST FOUR WEEKS HOW WOULD YOU RATE YOUR ASTHMA CONTROL: WELL CONTROLLED
QUESTION_4 LAST FOUR WEEKS HOW OFTEN HAVE YOU USED YOUR RESCUE INHALER OR NEBULIZER MEDICATION (SUCH AS ALBUTEROL): ONCE A WEEK OR LESS
QUESTION_1 LAST FOUR WEEKS HOW MUCH OF THE TIME DID YOUR ASTHMA KEEP YOU FROM GETTING AS MUCH DONE AT WORK, SCHOOL OR AT HOME: NONE OF THE TIME
ACT_TOTALSCORE: 21
ACT_TOTALSCORE: 21

## 2024-05-07 ASSESSMENT — PATIENT HEALTH QUESTIONNAIRE - PHQ9
SUM OF ALL RESPONSES TO PHQ QUESTIONS 1-9: 8
SUM OF ALL RESPONSES TO PHQ QUESTIONS 1-9: 8
10. IF YOU CHECKED OFF ANY PROBLEMS, HOW DIFFICULT HAVE THESE PROBLEMS MADE IT FOR YOU TO DO YOUR WORK, TAKE CARE OF THINGS AT HOME, OR GET ALONG WITH OTHER PEOPLE: SOMEWHAT DIFFICULT

## 2024-05-07 ASSESSMENT — SOCIAL DETERMINANTS OF HEALTH (SDOH): HOW OFTEN DO YOU GET TOGETHER WITH FRIENDS OR RELATIVES?: TWICE A WEEK

## 2024-05-07 NOTE — PROGRESS NOTES
Assessment & Plan     POTS (postural orthostatic tachycardia syndrome)  Requested paperwork for referral was signed    Atopic dermatitis, unspecified type  Triamcinolone refilled    Anal fissure  Diltiazem gel refilled    Moderate persistent asthma without complication  Stable, on inhalers    Screen for STD (sexually transmitted disease)  Screening labs ordered    Vaginitis and vulvovaginitis  Finished treatment and asymptomatic  Repeat smear ordered    Myalgia  Patient requested a TENS unit, DME prescription provided      Patient has been advised of split billing requirements and indicates understanding: Yes          Counseling  Appropriate preventive services were discussed with this patient, including applicable screening as appropriate for fall prevention, nutrition, physical activity, Tobacco-use cessation, weight loss and cognition.  Checklist reviewing preventive services available has been given to the patient.  Reviewed patient's diet, addressing concerns and/or questions.   She is at risk for lack of exercise and has been provided with information to increase physical activity for the benefit of her well-being.   The patient's PHQ-9 score is consistent with mild depression. She was provided with information regarding depression.       Follow-up in 6 months     Vicente Hanna is a 24 year old, presenting for the following health issues:  Establish Care  Joint pain everywhere  Consistent nose bleeds   Forms   Wet prep, BV and yeast, STD     HPI       Here to establish care.    Diagnosed with POTS in 2013, needs a referral to a specialist.  Has paperwork with her today and  she wants it  signed.    Complaining of nosebleeds for 2 months, intermittent, slight amount of bleeding.  Uses mometasone spray.      Was diagnosed with bacterial vaginosis recently and needs repeat smear.  She finished treatment and has no more symptoms.      Review of Systems  Constitutional, neuro, ENT, endocrine, pulmonary,  "cardiac, gastrointestinal, genitourinary, musculoskeletal, integument and psychiatric systems are negative, except as otherwise noted.      Objective    /78   Pulse 65   Temp 98.2  F (36.8  C) (Temporal)   Ht 1.778 m (5' 10\")   Wt 59.7 kg (131 lb 11.2 oz)   LMP 04/07/2024 (Exact Date)   SpO2 100%   BMI 18.90 kg/m    Body mass index is 18.9 kg/m .  Physical Exam   GENERAL: alert and no distress  NECK: no adenopathy, no asymmetry, masses, or scars  RESP: lungs clear to auscultation - no rales, rhonchi or wheezes  CV: regular rate and rhythm, normal S1 S2, no S3 or S4, no murmur, click or rub, no peripheral edema  ABDOMEN: soft, nontender, no hepatosplenomegaly, no masses and bowel sounds normal  MS: no gross musculoskeletal defects noted, no edema            Signed Electronically by: Chuy Saenz MD    "

## 2024-05-07 NOTE — TELEPHONE ENCOUNTER
[facility] pharmacy, [facility] is unable to order diltiazem gel. States that no [facility] have it. Please advise if alternative medication or if the patient should call around to see if can find in stock at another pharmacy. Annemarie Bolivar RN

## 2024-05-08 LAB
C TRACH DNA SPEC QL PROBE+SIG AMP: POSITIVE
HIV 1+2 AB+HIV1 P24 AG SERPL QL IA: NONREACTIVE
N GONORRHOEA DNA SPEC QL NAA+PROBE: NEGATIVE

## 2024-05-08 RX ORDER — DOXYCYCLINE 100 MG/1
100 CAPSULE ORAL 2 TIMES DAILY
Qty: 14 CAPSULE | Refills: 0 | Status: SHIPPED | OUTPATIENT
Start: 2024-05-08 | End: 2024-05-15

## 2024-05-08 NOTE — TELEPHONE ENCOUNTER
Relayed provider message; pt verbalizes understanding and agrees to plan of care. Pt will call to pharmacy to check if med is in stock.     Pt requesting lab results for wet prep:     WBCs/high power field  None 2+ Abnormal        Pt requesting provider interpretation; mychart message ok

## 2024-05-08 NOTE — TELEPHONE ENCOUNTER
The patient said the physical therapy referral that was placed back in Early December and they told the patient there wasn't enough of the visits was listed.    [employer] insurance never got the referral she meet with Kay Coello on 4/16/24 but she never entered the [facility] order or provided any updates at that time.    I told the patient we spoke with [employer] to verify the fax number she was given at that time was the right one, then check in with [employer] to make sure the fax we have for them is correct, if not to call us back with Correct fax or any other alternative ways it can be sent thank you.

## 2024-05-09 NOTE — TELEPHONE ENCOUNTER
The wet prep is negative for Vaginal infection.  The 2 + WBC is most likely a reaction to her Chlamydia infection.

## 2024-05-15 ENCOUNTER — TELEPHONE (OUTPATIENT)
Dept: PEDIATRICS | Facility: CLINIC | Age: 24
End: 2024-05-15
Payer: COMMERCIAL

## 2024-05-15 NOTE — TELEPHONE ENCOUNTER
Received a call from Nadine with [facility]     [phone] - detailed messages okay     Calling regarding insurance referral for physical therapy     Need insurance referral re-faxed over to insurance     States Service category code needs to be updated to physical therapy or won't be accepted     Fax is on form: [phone]    Stephanie BATISTA Triage RN  [facility]

## 2024-05-16 NOTE — TELEPHONE ENCOUNTER
Referral has been updated on [employer] Portal.  Ref# [id number]    Kay  [facility] Referrals

## 2024-05-22 NOTE — TELEPHONE ENCOUNTER
[facility] Ref# [id number] has been updated for 99 visits from 4-1-24 - 4-30-25.      Kay LAZARO Referrals

## 2024-05-22 NOTE — TELEPHONE ENCOUNTER
Patient called asking for PT referral updated to 99 visits and dates to 4/1/24 - 4/30/25. RN does not have access to [employer] Portal. Messaged Kay Dave, who said she will review referral and call patient.

## 2024-05-29 ENCOUNTER — TELEPHONE (OUTPATIENT)
Dept: INTERNAL MEDICINE | Facility: CLINIC | Age: 24
End: 2024-05-29
Payer: COMMERCIAL

## 2024-05-29 DIAGNOSIS — G90.A POTS (POSTURAL ORTHOSTATIC TACHYCARDIA SYNDROME): Primary | ICD-10-CM

## 2024-05-29 NOTE — TELEPHONE ENCOUNTER
Order/Referral Request    Who is requesting: [employer]    Orders being requested: Retro referral for physical therapy. Needs to start 03/28/2023 and go to 03/28/2024. Mom was under the impression that there would be an increase in visits. The approved referral # is [id number].    Reason service is needed/diagnosis: M22.2x2 lt knee patello femoral disorders.    When are orders needed by: ASAP    Has this been discussed with Provider: Yes Mom stated she discussed this with the doctor.    Does patient have a preference on a Group/Provider/Facility? [facility] in [facility]    Does patient have an appointment scheduled?: No    Where to send orders:  Referral can be enter into the provider portal. [employer]/provider-public    Could we send this information to you in [employer] or would you prefer to receive a phone call?:   Patient would prefer a phone call   Okay to leave a detailed message?: Yes at Other phone number:  [phone]

## 2024-05-30 NOTE — TELEPHONE ENCOUNTER
Patient Contact    Attempt # 1    Was call answered?  No.  Left message on voicemail with information to call me back.    Upon callback, please relay provider message. Pt has legal guardian listed in chart. No other consent to communicate on file.

## 2024-05-31 NOTE — TELEPHONE ENCOUNTER
Patient Contact    Attempt # 2    Was call answered?  No.  Left message on voicemail with information to call me back.    Upon call back, please relay referral information to the pt.     Thank you,  Alicia Pacheco RN

## 2024-06-03 NOTE — TELEPHONE ENCOUNTER
Patient Contact    Attempt # 3    Was call answered?  No.  Left message on voicemail with information to call me back.    Upon call back, please relay referral information to the pt.     Thank you,  Alicia Pacheco RN

## 2024-07-09 ENCOUNTER — TELEPHONE (OUTPATIENT)
Dept: INTERNAL MEDICINE | Facility: CLINIC | Age: 24
End: 2024-07-09
Payer: COMMERCIAL

## 2024-07-09 DIAGNOSIS — M25.562 LEFT KNEE PAIN, UNSPECIFIED CHRONICITY: Primary | ICD-10-CM

## 2024-07-09 NOTE — TELEPHONE ENCOUNTER
[facility]    Phone Message    May a detailed message be left on voicemail: yes     Reason for Call: Other: Seble from [employer] is reaching out for  to update the referral that was placed for [facility] as it appears on her end that it was not a valid referral. She is requesting for the referral to be updated to the following dates starting August 10 2023 to march 28 2024. Please advise.        Action Taken: Other: PCC    Travel Screening: Not Applicable     Date of Service: 7/09/24

## 2024-07-18 NOTE — TELEPHONE ENCOUNTER
Seble is following up, any chance the referral could be updated to the following dates in previous message? Please reach out to Seble if any questions.

## 2024-07-19 NOTE — TELEPHONE ENCOUNTER
Please see messages from below and advise.     Per chart review - unable to see a referral for [facility].

## 2024-07-25 NOTE — TELEPHONE ENCOUNTER
Please call [employer] to advise about ref feral as the dates were when patient was seeing Dr Brown. [phone]

## 2024-07-31 ENCOUNTER — TELEPHONE (OUTPATIENT)
Dept: INTERNAL MEDICINE | Facility: CLINIC | Age: 24
End: 2024-07-31
Payer: COMMERCIAL

## 2024-07-31 NOTE — TELEPHONE ENCOUNTER
May 7, 2024 , the  TEN's unit has to be  submitted as a claim of  chronic pain condition and not fibromyalgia.     Her insurance will not cover the cost for fibromyalgia .     They will cover for chronic pain.     She is getting a bill from DME , the cost is $675 for the TENS.     Do we update the DME from May 7th?     Faith Josue RN  -[facility]

## 2024-08-02 NOTE — TELEPHONE ENCOUNTER
Patient Contact    Attempt # 2    Was call answered?  No.  Left message on voicemail with information to call me back.    Upon call-back, please inquire about the TENs unit being requested and for clarification around why.

## 2024-08-02 NOTE — TELEPHONE ENCOUNTER
CC Contact    Attempt # 1    Was call answered?  No.  Left message on voicemail with information to call me back.    Upon callback, please determine what type of details Care Coordination is looking for from POTS referral for Dr. Saenz.

## 2024-08-05 NOTE — TELEPHONE ENCOUNTER
See 5/29 Tele enc. And original message below.   [employer] is requesting for the PT referral to be retro-dated to the following dates starting 8/10/23 - 3/28/24.      Patient did not established care with Dr. Saenz until 5/7/24, past the requested referral retro-dates.   Current PT referral was placed with start date 5/30/2024.        Per 9/29/23 Tele Enc., Dr. Brown placed a PT Referral for Left Knee Pain on 10/3/2023. At that time, the mother requested the referral to be for [facility] PT and dated to go through March 2024. The referral was placed allowing 30 visits only.     Is this the referral that insurance is requesting dates to be changed?

## 2024-08-05 NOTE — TELEPHONE ENCOUNTER
Unable to reach patient after 3 failed attempts. Routing back to provider for further recommendations.     If wanting to send letter, please route to team.    If patient returns missed call, please relay providers response that she does not have a diagnosis of Chronic Pain Condition in her chart. TENs unit DME diagnosis will not be changed.     Patient Contact    Attempt # 3    Was call answered?  No.  Left message on voicemail with information to call me back.

## 2024-08-06 NOTE — TELEPHONE ENCOUNTER
Insurance calling back to inquire about status of retroactive referral date.    Please contact Seble ZHONG.    Veena Borden RN

## 2024-08-08 NOTE — TELEPHONE ENCOUNTER
Insurance calling back to inquire about status of retroactive referral date.    States dates need to be 8/10/23 - 3/28/24. Looks like referral placed yesterday has the incorrect dates.          Update on provider protal no need to fax.    Lexie Vilchis RN

## 2024-08-12 NOTE — TELEPHONE ENCOUNTER
RN attempted to call to speak with Laura at [employer], but Laura was not available. RN GIORGIOM asking Laura to call the [facility] P[facility] back. Direct phone number of [employer]=[phone].

## 2024-08-13 ENCOUNTER — TELEPHONE (OUTPATIENT)
Dept: INTERNAL MEDICINE | Facility: CLINIC | Age: 24
End: 2024-08-13
Payer: COMMERCIAL

## 2024-08-13 NOTE — TELEPHONE ENCOUNTER
1) Referral DME -     Writer relayed PCP's response:        2)Provider that ordered PT on 5/16 only put 25 visits instead of 99?    Writer informed patient that Kay Dave had updated PT referral. [facility] If not, patient will contact clinic.    Reference:

## 2024-08-14 NOTE — TELEPHONE ENCOUNTER
The RN took an inbound call from [facility] at [facility]. Per Laura, the patient needed the number of visits increased to 48 visits for the retro-referral to [facility] ([address] [place] [address]) for dates 8/10/2023-3/28/2024.

## 2024-08-20 ENCOUNTER — TELEPHONE (OUTPATIENT)
Dept: INTERNAL MEDICINE | Facility: CLINIC | Age: 24
End: 2024-08-20
Payer: COMMERCIAL

## 2024-08-20 NOTE — TELEPHONE ENCOUNTER
[facility] staff member calling to check on status of retro active referral request for [facility] covering dates of service 8-23 to 3-23. Phone number provided for [facility] [place] Referrals Department for assistance.     Estefania Blanchard RN

## 2024-09-04 ENCOUNTER — TELEPHONE (OUTPATIENT)
Dept: INTERNAL MEDICINE | Facility: CLINIC | Age: 24
End: 2024-09-04
Payer: COMMERCIAL

## 2024-09-04 ENCOUNTER — TELEPHONE (OUTPATIENT)
Dept: PEDIATRICS | Facility: CLINIC | Age: 24
End: 2024-09-04
Payer: COMMERCIAL

## 2024-09-04 NOTE — TELEPHONE ENCOUNTER
Cyndi is calling to see if Dr. Zuniga ever diagnosed her with chronic pain. Upon chart review informed Cyndi that dr. Zuniga did mention chronic pain in some of her notes, however it is not listed in her problem list. Cyndi was wondering as she now see's an adult provider. Did route to Dr. Zuniga to check regarding diagnosis. Informed Cyndi to reach out if she has any further questions/concerns. Cyndi was comfortable with and understanding of this plan. No further questions at this time.

## 2024-09-04 NOTE — TELEPHONE ENCOUNTER
Pt called the clinic stating the insurance company needs the form for the TENS unit to be changed to chronic pain.     Writer informed her there is no dx of chronic pain on her problem list. Pt stated this dx was from 10 years ago through [facility]. Pt stated her POTS causes her pain.     Does pt need an appointment to discuss this with a provider?     Routing to PCP to review and advise.     Please call pt back with PCPs recommendations.

## 2024-09-05 NOTE — TELEPHONE ENCOUNTER
Patient Contact    Attempt # 1    Was call answered?  No.  Left message on voicemail with information to call me back.

## 2024-09-09 NOTE — TELEPHONE ENCOUNTER
Attempted to contact pt to relay providers message from below. No answer. Left VM to call us back.     On callback- please relay providers message from below.      Since this was attempt #3 at reaching pt but unsuccessful, routing to PCP to advise if further action is needed.     Patient Contact    Attempt # 3    Was call answered?  No.  Left message on voicemail with information to call me back.

## 2025-01-07 ENCOUNTER — LAB REQUISITION (OUTPATIENT)
Dept: LAB | Facility: CLINIC | Age: 25
End: 2025-01-07
Payer: COMMERCIAL

## 2025-01-07 ENCOUNTER — LAB REQUISITION (OUTPATIENT)
Dept: LAB | Facility: CLINIC | Age: 25
End: 2025-01-07

## 2025-01-07 DIAGNOSIS — Z11.3 ENCOUNTER FOR SCREENING FOR INFECTIONS WITH A PREDOMINANTLY SEXUAL MODE OF TRANSMISSION: ICD-10-CM

## 2025-01-07 DIAGNOSIS — N89.8 OTHER SPECIFIED NONINFLAMMATORY DISORDERS OF VAGINA: ICD-10-CM

## 2025-01-07 PROCEDURE — 87102 FUNGUS ISOLATION CULTURE: CPT | Mod: ORL | Performed by: NURSE PRACTITIONER

## 2025-01-07 PROCEDURE — 87491 CHLMYD TRACH DNA AMP PROBE: CPT | Performed by: NURSE PRACTITIONER

## 2025-01-07 PROCEDURE — 87798 DETECT AGENT NOS DNA AMP: CPT | Performed by: NURSE PRACTITIONER

## 2025-01-07 PROCEDURE — 87591 N.GONORRHOEAE DNA AMP PROB: CPT | Performed by: NURSE PRACTITIONER

## 2025-01-07 PROCEDURE — 87102 FUNGUS ISOLATION CULTURE: CPT | Performed by: NURSE PRACTITIONER

## 2025-01-08 LAB
C TRACH DNA SPEC QL PROBE+SIG AMP: NEGATIVE
N GONORRHOEA DNA SPEC QL NAA+PROBE: NEGATIVE
SPECIMEN TYPE: NORMAL

## 2025-01-09 LAB — BACTERIA SPEC CULT: NORMAL

## 2025-01-10 LAB
M GENITALIUM DNA SPEC QL NAA+PROBE: NOT DETECTED
M HOMINIS DNA SPEC QL NAA+PROBE: NOT DETECTED
U PARVUM DNA SPEC QL NAA+PROBE: NOT DETECTED
U UREALYTICUM DNA SPEC QL NAA+PROBE: NOT DETECTED

## 2025-01-11 LAB — BACTERIA SPEC CULT: NO GROWTH

## 2025-01-15 ENCOUNTER — HOSPITAL ENCOUNTER (EMERGENCY)
Facility: CLINIC | Age: 25
Discharge: HOME | End: 2025-01-15
Attending: EMERGENCY MEDICINE | Admitting: EMERGENCY MEDICINE
Payer: COMMERCIAL

## 2025-01-15 VITALS
DIASTOLIC BLOOD PRESSURE: 79 MMHG | HEART RATE: 94 BPM | WEIGHT: 127 LBS | TEMPERATURE: 99.4 F | BODY MASS INDEX: 18.22 KG/M2 | RESPIRATION RATE: 18 BRPM | SYSTOLIC BLOOD PRESSURE: 113 MMHG | OXYGEN SATURATION: 100 %

## 2025-01-15 DIAGNOSIS — J02.9 VIRAL PHARYNGITIS: ICD-10-CM

## 2025-01-15 LAB
FLUAV RNA SPEC QL NAA+PROBE: NEGATIVE
FLUBV RNA RESP QL NAA+PROBE: NEGATIVE
MONOCYTES NFR BLD AUTO: NEGATIVE %
RSV RNA SPEC NAA+PROBE: NEGATIVE
S PYO DNA THROAT QL NAA+PROBE: NOT DETECTED
SARS-COV-2 RNA RESP QL NAA+PROBE: NEGATIVE

## 2025-01-15 PROCEDURE — 87651 STREP A DNA AMP PROBE: CPT | Performed by: EMERGENCY MEDICINE

## 2025-01-15 PROCEDURE — 99283 EMERGENCY DEPT VISIT LOW MDM: CPT

## 2025-01-15 PROCEDURE — 36415 COLL VENOUS BLD VENIPUNCTURE: CPT | Performed by: EMERGENCY MEDICINE

## 2025-01-15 PROCEDURE — 87637 SARSCOV2&INF A&B&RSV AMP PRB: CPT | Performed by: EMERGENCY MEDICINE

## 2025-01-15 PROCEDURE — 250N000013 HC RX MED GY IP 250 OP 250 PS 637: Performed by: EMERGENCY MEDICINE

## 2025-01-15 PROCEDURE — 86308 HETEROPHILE ANTIBODY SCREEN: CPT | Performed by: EMERGENCY MEDICINE

## 2025-01-15 RX ORDER — ACETAMINOPHEN 500 MG
1000 TABLET ORAL ONCE
Status: COMPLETED | OUTPATIENT
Start: 2025-01-15 | End: 2025-01-15

## 2025-01-15 RX ADMIN — ACETAMINOPHEN 1000 MG: 500 TABLET ORAL at 19:15

## 2025-01-15 ASSESSMENT — ACTIVITIES OF DAILY LIVING (ADL)
ADLS_ACUITY_SCORE: 41
ADLS_ACUITY_SCORE: 41

## 2025-01-16 NOTE — ED PROVIDER NOTES
Emergency Department Note      History of Present Illness     Chief Complaint   Pharyngitis      HPI   Cyndi Anders Grady is a 24 year old female with history of asthma who presents for pharyngitis. The patient reports her tonsils swelled up over night 2 nights ago and the developed white puss spots on them. Reports mild throat pain that is worse on the left sides. She endorses body aches and soreness that is worse than her throat pain. She also endorses headache, lightheadedness and mild ear pain. Reports drinking a lot of water, hot tea and soup. Denies fever, vomit, diarrhea, breathing problems and sick contact. Denies having mono before. Reports taking duloxetine and Symbicort, 2 puffs daily. She also has albuterol for emergency.  She denies any wheezing or feeling like she is having an asthma exacerbation.  No breathing changes.  Patient denies any other symptoms at this time.    Independent Historian   None    Review of External Notes   None    Past Medical History     Medical History and Problem List   Chronic daily headache  Chronic nausea  Dysmenorrhea  GERD  Herpes simplex vulvovaginitis  MDD  Moderate asthma  Patellofemoral pain syndrome of left knee  POTS  Reactive depression     Medications   Diflucan  Valtrex  Albuterol  Symbicort  Cymbalta  EPI  Nexplanon  Claritin  Nasonex    Surgical History   Umbilical hernia repair    Physical Exam     Patient Vitals for the past 24 hrs:   BP Temp Temp src Pulse Resp SpO2 Weight   01/15/25 1910 113/79 99.4  F (37.4  C) Temporal 94 18 100 % 57.6 kg (127 lb)     Physical Exam  General: Well appearing, nontoxic.  Resting comfortably  Head:  Scalp, face, and head appear normal  Eyes:  Pupils are equal, round    Conjunctivae non-injected and sclerae white  ENT:    The external nose is normal    Pinnae are normal. Bilateral TMs clear without erythema, bulging, or effusion. Auditory canals normal.     Posterior pharynx is erythematous.  There is 2+ tonsillar  swelling bilaterally with exudates overlying the tonsils.  Tongue is normal.  Mucous members are moist.  Posterior pharynx is otherwise clear. No peritonsillar fullness. No tongue elevation. Voice normal. No stridor.  Neck:  Normal range of motion    There is no rigidity noted    Trachea is in the midline  CV:  Regular rate and rhythm     Normal S1/S2, no S3/S4    No murmur or rub. Radial pulses 2+ bilaterally.  Resp:  Lungs are clear and equal bilaterally  There is no tachypnea    No increased work of breathing    No rales, wheezing, or rhonchi  GI:  No distention.   MS:  Normal muscular tone  Skin:  No rash or acute skin lesions noted  Neuro:  Awake and alert  Speech is normal and fluent  Moves all extremities spontaneously  Psych: Normal affect. Appropriate interactions.      Diagnostics     Lab Results   Labs Ordered and Resulted from Time of ED Arrival to Time of ED Departure   INFLUENZA A/B, RSV AND SARS-COV2 PCR - Normal       Result Value    Influenza A PCR Negative      Influenza B PCR Negative      RSV PCR Negative      SARS CoV2 PCR Negative     MONONUCLEOSIS SCREEN - Normal    Mononucleosis Screen Negative     GROUP A STREPTOCOCCUS PCR THROAT SWAB - Normal    Group A strep by PCR Not Detected         Imaging   No orders to display       Independent Interpretation   None    ED Course      Medications Administered   Medications   acetaminophen (TYLENOL) tablet 1,000 mg (1,000 mg Oral $Given 1/15/25 1915)       Procedures   Procedures     Discussion of Management   None    ED Course   ED Course as of 01/16/25 1253   Wed Yobany 15, 2025   2038 I obtained history and examined the patient as noted above     2240 Patient updated regarding findings and plan of care.        Additional Documentation  None    Medical Decision Making / Diagnosis     CMS Diagnoses: None    MIPS       None    [facility]   Cyndi Anders Grady is a 24 year old female patient presents with sore throat, myalgias, fatigue and clinical evidence  of pharyngitis with tonsillar swelling and tonsillar exudates.  No peritonsillar fullness, evidence of peritonsillar abscess, retropharyngeal abscess, Juanpablo's angina or other deep space infection of the head or neck.  Strep PCR testing is negative. COVID/Influenza/RSV testing negative.  Mononucleosis screen is negative.  Evaluation is most consistent with viral pharyngitis.  No indication for antibiotics at this time.  No evidence of any airway compromise.  Patient can swallow without difficulty.  She has a history of asthma but at this time her lungs are clear without wheezing or bronchospasm.  No clinical evidence of pneumonia, otitis media or other complication.  I recommended supportive care at home with Tylenol, ibuprofen, over-the-counter cold and sore throat medications.  Close return precautions were provided.  I recommended outpatient primary care follow-up if symptoms not resolved.  Patient was discharged in stable condition.    Disposition   The patient was discharged.     Diagnosis     ICD-10-CM    1. Viral pharyngitis  J02.9            Discharge Medications   Discharge Medication List as of 1/15/2025 10:43 PM            Scribe Disclosure:  I, Javier Valencia, am serving as a scribe at 8:37 PM on 1/15/2025 to document services personally performed by Michael Mooney MD based on my observations and the provider's statements to me.        Michael Mooney MD  01/16/25 1037

## 2025-01-16 NOTE — ED TRIAGE NOTES
Pt presents with sore throat that began 2 days ago. Pt also reports swollen tonsils, body aches.

## 2025-04-07 NOTE — TELEPHONE ENCOUNTER
Reason for Call:  Other - Patient Question    Detailed comments: Mom called and stated she has a follow up question for Dr. Zuniga regarding the visit on 9/22/20. She preferred to speak with Dr. Zuniga with about this. Please call back to discuss.     Phone Number Patient can be reached at: Home number on file [phone] (home)    Best Time: Anytime    Can we leave a detailed message on this number? YES    Call taken on 10/7/2020 at 11:28 AM by Anni Weeks       Report called to primary RN

## 2025-04-30 ENCOUNTER — OFFICE VISIT (OUTPATIENT)
Dept: FAMILY MEDICINE | Facility: CLINIC | Age: 25
End: 2025-04-30
Payer: COMMERCIAL

## 2025-04-30 DIAGNOSIS — M25.561 ARTHRALGIA OF BOTH KNEES: Primary | ICD-10-CM

## 2025-04-30 DIAGNOSIS — M25.562 ARTHRALGIA OF BOTH KNEES: Primary | ICD-10-CM

## 2025-04-30 DIAGNOSIS — M25.531 PAIN IN BOTH WRISTS: ICD-10-CM

## 2025-04-30 DIAGNOSIS — M25.551 PAIN OF BOTH HIP JOINTS: ICD-10-CM

## 2025-04-30 DIAGNOSIS — E55.9 VITAMIN D DEFICIENCY: ICD-10-CM

## 2025-04-30 DIAGNOSIS — Z11.3 SCREEN FOR STD (SEXUALLY TRANSMITTED DISEASE): ICD-10-CM

## 2025-04-30 DIAGNOSIS — N94.6 DYSMENORRHEA: ICD-10-CM

## 2025-04-30 DIAGNOSIS — N89.8 VAGINAL DISCHARGE: ICD-10-CM

## 2025-04-30 DIAGNOSIS — M25.532 PAIN IN BOTH WRISTS: ICD-10-CM

## 2025-04-30 DIAGNOSIS — G90.A POTS (POSTURAL ORTHOSTATIC TACHYCARDIA SYNDROME): ICD-10-CM

## 2025-04-30 DIAGNOSIS — A60.04 HERPES SIMPLEX VULVOVAGINITIS: ICD-10-CM

## 2025-04-30 DIAGNOSIS — M25.552 PAIN OF BOTH HIP JOINTS: ICD-10-CM

## 2025-04-30 DIAGNOSIS — K60.2 ANAL FISSURE: ICD-10-CM

## 2025-04-30 DIAGNOSIS — Z87.09 HISTORY OF FREQUENT UPPER RESPIRATORY INFECTION: ICD-10-CM

## 2025-04-30 LAB
ALBUMIN SERPL BCG-MCNC: 4.5 G/DL (ref 3.5–5.2)
ALP SERPL-CCNC: 45 U/L (ref 40–150)
ALT SERPL W P-5'-P-CCNC: 15 U/L (ref 0–50)
ANION GAP SERPL CALCULATED.3IONS-SCNC: 11 MMOL/L (ref 7–15)
AST SERPL W P-5'-P-CCNC: 19 U/L (ref 0–45)
BASOPHILS # BLD AUTO: 0.1 10E3/UL (ref 0–0.2)
BASOPHILS NFR BLD AUTO: 1 %
BILIRUB SERPL-MCNC: 0.6 MG/DL
BUN SERPL-MCNC: 8.5 MG/DL (ref 6–20)
CALCIUM SERPL-MCNC: 9.5 MG/DL (ref 8.8–10.4)
CHLORIDE SERPL-SCNC: 105 MMOL/L (ref 98–107)
CLUE CELLS: ABNORMAL
CREAT SERPL-MCNC: 0.75 MG/DL (ref 0.51–0.95)
CRP SERPL-MCNC: <3 MG/L
EGFRCR SERPLBLD CKD-EPI 2021: >90 ML/MIN/1.73M2
EOSINOPHIL # BLD AUTO: 0.6 10E3/UL (ref 0–0.7)
EOSINOPHIL NFR BLD AUTO: 8 %
ERYTHROCYTE [DISTWIDTH] IN BLOOD BY AUTOMATED COUNT: 12.7 % (ref 10–15)
ERYTHROCYTE [SEDIMENTATION RATE] IN BLOOD BY WESTERGREN METHOD: 3 MM/HR (ref 0–20)
EST. AVERAGE GLUCOSE BLD GHB EST-MCNC: 88 MG/DL
GLUCOSE SERPL-MCNC: 87 MG/DL (ref 70–99)
HBA1C MFR BLD: 4.7 % (ref 0–5.6)
HCO3 SERPL-SCNC: 24 MMOL/L (ref 22–29)
HCT VFR BLD AUTO: 44.5 % (ref 35–47)
HCV AB SERPL QL IA: NONREACTIVE
HGB BLD-MCNC: 14.5 G/DL (ref 11.7–15.7)
HIV 1+2 AB+HIV1 P24 AG SERPL QL IA: NONREACTIVE
IMM GRANULOCYTES # BLD: 0 10E3/UL
IMM GRANULOCYTES NFR BLD: 0 %
LYMPHOCYTES # BLD AUTO: 1.9 10E3/UL (ref 0.8–5.3)
LYMPHOCYTES NFR BLD AUTO: 26 %
MCH RBC QN AUTO: 28.4 PG (ref 26.5–33)
MCHC RBC AUTO-ENTMCNC: 32.6 G/DL (ref 31.5–36.5)
MCV RBC AUTO: 87 FL (ref 78–100)
MONOCYTES # BLD AUTO: 0.5 10E3/UL (ref 0–1.3)
MONOCYTES NFR BLD AUTO: 7 %
NEUTROPHILS # BLD AUTO: 4.1 10E3/UL (ref 1.6–8.3)
NEUTROPHILS NFR BLD AUTO: 57 %
PLATELET # BLD AUTO: 337 10E3/UL (ref 150–450)
POTASSIUM SERPL-SCNC: 3.9 MMOL/L (ref 3.4–5.3)
PROT SERPL-MCNC: 7.4 G/DL (ref 6.4–8.3)
RBC # BLD AUTO: 5.11 10E6/UL (ref 3.8–5.2)
RHEUMATOID FACT SERPL-ACNC: <10 IU/ML
SODIUM SERPL-SCNC: 140 MMOL/L (ref 135–145)
T PALLIDUM AB SER QL: NONREACTIVE
TRICHOMONAS, WET PREP: ABNORMAL
TSH SERPL DL<=0.005 MIU/L-ACNC: 1.6 UIU/ML (ref 0.3–4.2)
VIT D+METAB SERPL-MCNC: 35 NG/ML (ref 20–50)
WBC # BLD AUTO: 7.2 10E3/UL (ref 4–11)
WBC'S/HIGH POWER FIELD, WET PREP: ABNORMAL
YEAST, WET PREP: ABNORMAL

## 2025-04-30 PROCEDURE — G2211 COMPLEX E/M VISIT ADD ON: HCPCS | Performed by: FAMILY MEDICINE

## 2025-04-30 PROCEDURE — 86780 TREPONEMA PALLIDUM: CPT | Performed by: FAMILY MEDICINE

## 2025-04-30 PROCEDURE — 85652 RBC SED RATE AUTOMATED: CPT | Performed by: FAMILY MEDICINE

## 2025-04-30 PROCEDURE — 36415 COLL VENOUS BLD VENIPUNCTURE: CPT | Performed by: FAMILY MEDICINE

## 2025-04-30 PROCEDURE — 90480 ADMN SARSCOV2 VAC 1/ONLY CMP: CPT | Performed by: FAMILY MEDICINE

## 2025-04-30 PROCEDURE — 86140 C-REACTIVE PROTEIN: CPT | Performed by: FAMILY MEDICINE

## 2025-04-30 PROCEDURE — 86200 CCP ANTIBODY: CPT | Performed by: FAMILY MEDICINE

## 2025-04-30 PROCEDURE — 84443 ASSAY THYROID STIM HORMONE: CPT | Performed by: FAMILY MEDICINE

## 2025-04-30 PROCEDURE — 82306 VITAMIN D 25 HYDROXY: CPT | Performed by: FAMILY MEDICINE

## 2025-04-30 PROCEDURE — 90677 PCV20 VACCINE IM: CPT | Performed by: FAMILY MEDICINE

## 2025-04-30 PROCEDURE — 85025 COMPLETE CBC W/AUTO DIFF WBC: CPT | Performed by: FAMILY MEDICINE

## 2025-04-30 PROCEDURE — 83036 HEMOGLOBIN GLYCOSYLATED A1C: CPT | Performed by: FAMILY MEDICINE

## 2025-04-30 PROCEDURE — 1126F AMNT PAIN NOTED NONE PRSNT: CPT | Performed by: FAMILY MEDICINE

## 2025-04-30 PROCEDURE — 3078F DIAST BP <80 MM HG: CPT | Performed by: FAMILY MEDICINE

## 2025-04-30 PROCEDURE — 90471 IMMUNIZATION ADMIN: CPT | Performed by: FAMILY MEDICINE

## 2025-04-30 PROCEDURE — 3074F SYST BP LT 130 MM HG: CPT | Performed by: FAMILY MEDICINE

## 2025-04-30 PROCEDURE — 86038 ANTINUCLEAR ANTIBODIES: CPT | Performed by: FAMILY MEDICINE

## 2025-04-30 PROCEDURE — 99215 OFFICE O/P EST HI 40 MIN: CPT | Mod: 25 | Performed by: FAMILY MEDICINE

## 2025-04-30 PROCEDURE — 86364 TISS TRNSGLTMNASE EA IG CLAS: CPT | Performed by: FAMILY MEDICINE

## 2025-04-30 PROCEDURE — 91320 SARSCV2 VAC 30MCG TRS-SUC IM: CPT | Performed by: FAMILY MEDICINE

## 2025-04-30 PROCEDURE — 87591 N.GONORRHOEAE DNA AMP PROB: CPT | Performed by: FAMILY MEDICINE

## 2025-04-30 PROCEDURE — 80053 COMPREHEN METABOLIC PANEL: CPT | Performed by: FAMILY MEDICINE

## 2025-04-30 PROCEDURE — 87210 SMEAR WET MOUNT SALINE/INK: CPT | Performed by: FAMILY MEDICINE

## 2025-04-30 PROCEDURE — 82784 ASSAY IGA/IGD/IGG/IGM EACH: CPT | Performed by: FAMILY MEDICINE

## 2025-04-30 PROCEDURE — 86431 RHEUMATOID FACTOR QUANT: CPT | Performed by: FAMILY MEDICINE

## 2025-04-30 PROCEDURE — 87389 HIV-1 AG W/HIV-1&-2 AB AG IA: CPT | Performed by: FAMILY MEDICINE

## 2025-04-30 PROCEDURE — 87491 CHLMYD TRACH DNA AMP PROBE: CPT | Performed by: FAMILY MEDICINE

## 2025-04-30 PROCEDURE — 86803 HEPATITIS C AB TEST: CPT | Performed by: FAMILY MEDICINE

## 2025-04-30 ASSESSMENT — ANXIETY QUESTIONNAIRES
5. BEING SO RESTLESS THAT IT IS HARD TO SIT STILL: NOT AT ALL
7. FEELING AFRAID AS IF SOMETHING AWFUL MIGHT HAPPEN: NOT AT ALL
7. FEELING AFRAID AS IF SOMETHING AWFUL MIGHT HAPPEN: NOT AT ALL
8. IF YOU CHECKED OFF ANY PROBLEMS, HOW DIFFICULT HAVE THESE MADE IT FOR YOU TO DO YOUR WORK, TAKE CARE OF THINGS AT HOME, OR GET ALONG WITH OTHER PEOPLE?: SOMEWHAT DIFFICULT
2. NOT BEING ABLE TO STOP OR CONTROL WORRYING: NOT AT ALL
4. TROUBLE RELAXING: MORE THAN HALF THE DAYS
GAD7 TOTAL SCORE: 5
GAD7 TOTAL SCORE: 5
6. BECOMING EASILY ANNOYED OR IRRITABLE: SEVERAL DAYS
GAD7 TOTAL SCORE: 5
3. WORRYING TOO MUCH ABOUT DIFFERENT THINGS: SEVERAL DAYS
IF YOU CHECKED OFF ANY PROBLEMS ON THIS QUESTIONNAIRE, HOW DIFFICULT HAVE THESE PROBLEMS MADE IT FOR YOU TO DO YOUR WORK, TAKE CARE OF THINGS AT HOME, OR GET ALONG WITH OTHER PEOPLE: SOMEWHAT DIFFICULT
1. FEELING NERVOUS, ANXIOUS, OR ON EDGE: SEVERAL DAYS

## 2025-04-30 ASSESSMENT — ASTHMA QUESTIONNAIRES
QUESTION_3 LAST FOUR WEEKS HOW OFTEN DID YOUR ASTHMA SYMPTOMS (WHEEZING, COUGHING, SHORTNESS OF BREATH, CHEST TIGHTNESS OR PAIN) WAKE YOU UP AT NIGHT OR EARLIER THAN USUAL IN THE MORNING: ONCE OR TWICE
QUESTION_5 LAST FOUR WEEKS HOW WOULD YOU RATE YOUR ASTHMA CONTROL: WELL CONTROLLED
ACT_TOTALSCORE: 19
QUESTION_1 LAST FOUR WEEKS HOW MUCH OF THE TIME DID YOUR ASTHMA KEEP YOU FROM GETTING AS MUCH DONE AT WORK, SCHOOL OR AT HOME: A LITTLE OF THE TIME
QUESTION_4 LAST FOUR WEEKS HOW OFTEN HAVE YOU USED YOUR RESCUE INHALER OR NEBULIZER MEDICATION (SUCH AS ALBUTEROL): TWO OR THREE TIMES PER WEEK
QUESTION_2 LAST FOUR WEEKS HOW OFTEN HAVE YOU HAD SHORTNESS OF BREATH: ONCE OR TWICE A WEEK

## 2025-04-30 NOTE — PROGRESS NOTES
Assessment & Plan       ICD-10-CM    1. Arthralgia of both knees  M25.561 Adult Rheumatology  Referral    M25.562 Anti Nuclear Apolinar IgG by IFA with Reflex     Rheumatoid factor     Cyclic Citrullinated Peptide Antibody IgG     CRP, inflammation     Anti Nuclear Apolinar IgG by IFA with Reflex     CBC with platelets and differential     Comprehensive metabolic panel (BMP + Alb, Alk Phos, ALT, AST, Total. Bili, TP)     TSH with free T4 reflex     Hemoglobin A1c     Treponema Abs w Reflex to RPR and Titer     IgA     Tissue transglutaminase apolinar IgA and IgG     Physical Therapy  Referral     ESR: Erythrocyte sedimentation rate     Anti Nuclear Apolinar IgG by IFA with Reflex     Rheumatoid factor     Cyclic Citrullinated Peptide Antibody IgG     CRP, inflammation     CBC with platelets and differential     Comprehensive metabolic panel (BMP + Alb, Alk Phos, ALT, AST, Total. Bili, TP)     TSH with free T4 reflex     Hemoglobin A1c     Treponema Abs w Reflex to RPR and Titer     IgA     Tissue transglutaminase apolinar IgA and IgG     ESR: Erythrocyte sedimentation rate     CANCELED: Anti Nuclear Apolinar IgG by IFA with Reflex      2. Pain of both hip joints  M25.551 Adult Rheumatology  Referral    M25.552 Anti Nuclear Apolinar IgG by IFA with Reflex     Rheumatoid factor     Cyclic Citrullinated Peptide Antibody IgG     CRP, inflammation     Anti Nuclear Apolinar IgG by IFA with Reflex     CBC with platelets and differential     Comprehensive metabolic panel (BMP + Alb, Alk Phos, ALT, AST, Total. Bili, TP)     TSH with free T4 reflex     Hemoglobin A1c     Treponema Abs w Reflex to RPR and Titer     IgA     Tissue transglutaminase apolinar IgA and IgG     Physical Therapy  Referral     ESR: Erythrocyte sedimentation rate     Anti Nuclear Apolinar IgG by IFA with Reflex     Rheumatoid factor     Cyclic Citrullinated Peptide Antibody IgG     CRP, inflammation     CBC with platelets and differential     Comprehensive  metabolic panel (BMP + Alb, Alk Phos, ALT, AST, Total. Bili, TP)     TSH with free T4 reflex     Hemoglobin A1c     Treponema Abs w Reflex to RPR and Titer     IgA     Tissue transglutaminase apolinar IgA and IgG     ESR: Erythrocyte sedimentation rate     CANCELED: Anti Nuclear Apolinar IgG by IFA with Reflex      3. Pain in both wrists  M25.531 Adult Rheumatology  Referral    M25.532 Anti Nuclear Apolinar IgG by IFA with Reflex     Rheumatoid factor     Cyclic Citrullinated Peptide Antibody IgG     CRP, inflammation     Anti Nuclear Apolinar IgG by IFA with Reflex     CBC with platelets and differential     Comprehensive metabolic panel (BMP + Alb, Alk Phos, ALT, AST, Total. Bili, TP)     TSH with free T4 reflex     Hemoglobin A1c     Treponema Abs w Reflex to RPR and Titer     IgA     Tissue transglutaminase apolinar IgA and IgG     ESR: Erythrocyte sedimentation rate     Anti Nuclear Apolinar IgG by IFA with Reflex     Rheumatoid factor     Cyclic Citrullinated Peptide Antibody IgG     CRP, inflammation     CBC with platelets and differential     Comprehensive metabolic panel (BMP + Alb, Alk Phos, ALT, AST, Total. Bili, TP)     TSH with free T4 reflex     Hemoglobin A1c     Treponema Abs w Reflex to RPR and Titer     IgA     Tissue transglutaminase apolinar IgA and IgG     ESR: Erythrocyte sedimentation rate     CANCELED: Anti Nuclear Apolinar IgG by IFA with Reflex      4. History of frequent upper respiratory infection  Z87.09 Adult Rheumatology  Referral     ESR: Erythrocyte sedimentation rate     ESR: Erythrocyte sedimentation rate      5. Dysmenorrhea  N94.6 Physical Therapy  Referral      6. Anal fissure  K60.2 diltiazem 2% cream      7. POTS (postural orthostatic tachycardia syndrome)  G90.A Physical Therapy  Referral      8. Vitamin D deficiency  E55.9 Vitamin D Deficiency     Vitamin D Deficiency      9. Screen for STD (sexually transmitted disease)  Z11.3 HIV Antigen Antibody Combo     Hepatitis C Screen  Reflex to HCV RNA Quant and Genotype     HIV Antigen Antibody Combo     Hepatitis C Screen Reflex to HCV RNA Quant and Genotype      10. Vaginal discharge  N89.8 Wet prep - Clinic Collect     Chlamydia trachomatis/Neisseria gonorrhoeae by PCR - Clinic Collect      11. 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  Unsure why she continues to have multiple joint pains, no work up seen in the last few years by this provider.  No rash. She does get some nausea and gerd issues as well.  She did have weight loss but recently has been stable  No joint swelling, no family history of auto immune issues.  She has seen ortho and physical therapy and xray were normal in the past  She denies any issues with mental health and does not want medication to help with her symptoms.  She would like more though work up  Declined imaging tests today/ we did discuss doing initial labs.  She does see cards for her pots syndrome and physical therapy.  She would like a new referral to physical therapy for her conditions.,referral placed  Advised rheum referral as well for further evaluation    Advised being seen in the next 2-3 months for wellness visit and recheck of symptoms and follow up from today results as well    Anal fissure history -want prn cream refilled, no constipation now        The longitudinal plan of care for the diagnosis(es)/condition(s) as documented were addressed during this visit. Due to the added complexity in care, I will continue to support Cyndi in the subsequent management and with ongoing continuity of care.    Spent 50min greater than 50% of counseling and coordination of care for the conditions documented above.          Follow-up       Vicente Hanna is a 25 year old, presenting for the following health issues:  Establish Care, Pain (Chronic/), and Referral  (Rheumatology)        4/30/2025    12:50 PM   Additional Questions   Roomed by Nicolasa     Patient is wanting referral to Rheumatology.   Patient needs new referral fpor Physical therapy as well.   Needs an RX for a compounded medication.   2012 -chronic pain, all joints and muscles and nausea poor circulation  [facility] diagnosis -pots syndrome diagnosis -2013   Light headedness-physical therapy     Weight loss and chronic pain   Bilateral knee pain off and on hard time extending her knee  Saw ortho, recommended physical therapy  2024 saw another ortho andd done imaging and was normal  With physical therapy it is normal  Hip and knee off and and on  No swelling,   Ankles , knee and hip pain off and on, ankle due to previous injuries    2021 weight is stable,   Pain clinic 2013 at [place] -did not get any answers, she was told it was due constipation,   Ice, resting and stretching    Mother endometriosis-[facility] to be worked up     Chronic pain with her period-she is working with gyn, she thinks her hip pain may be due to that    2 times a month for hip pain is really bad  Tens unit , massage helps she goes to [facility] physical therapy     Wrist , hip , shoulder , wrist , knee, ankles hip  No meds tied, fibromyalgia type meds  Cybalta may be             History of Present Illness       Reason for visit:  Repeated sickness and chronic pain in joints    She eats 2-3 servings of fruits and vegetables daily.She consumes 3 sweetened beverage(s) daily.She exercises with enough effort to increase her heart rate 10 to 19 minutes per day.  She exercises with enough effort to increase her heart rate 3 or less days per week. She is missing 1 dose(s) of medications per week.  She is not taking prescribed medications regularly due to remembering to take.              Review of Systems  Constitutional, HEENT, cardiovascular, pulmonary, GI, , musculoskeletal, neuro, skin, endocrine and psych systems are negative, except as  otherwise noted.      Objective    LMP 04/13/2025   There is no height or weight on file to calculate BMI.  Physical Exam   GENERAL: alert and no distress  NECK: no adenopathy, no asymmetry, masses, or scars  RESP: lungs clear to auscultation - no rales, rhonchi or wheezes  CV: regular rate and rhythm, normal S1 S2, no S3 or S4, no murmur, click or rub, no peripheral edema  ABDOMEN: soft, nontender, no hepatosplenomegaly, no masses and bowel sounds normal  MS: no gross musculoskeletal defects noted, no edema    Results for orders placed or performed in visit on 04/30/25   Hemoglobin A1c     Status: Normal   Result Value Ref Range    Estimated Average Glucose 88 <117 mg/dL    Hemoglobin A1C 4.7 0.0 - 5.6 %   CBC with platelets and differential     Status: None   Result Value Ref Range    WBC Count 7.2 4.0 - 11.0 10e3/uL    RBC Count 5.11 3.80 - 5.20 10e6/uL    Hemoglobin 14.5 11.7 - 15.7 g/dL    Hematocrit 44.5 35.0 - 47.0 %    MCV 87 78 - 100 fL    MCH 28.4 26.5 - 33.0 pg    MCHC 32.6 31.5 - 36.5 g/dL    RDW 12.7 10.0 - 15.0 %    Platelet Count 337 150 - 450 10e3/uL    % Neutrophils 57 %    % Lymphocytes 26 %    % Monocytes 7 %    % Eosinophils 8 %    % Basophils 1 %    % Immature Granulocytes 0 %    Absolute Neutrophils 4.1 1.6 - 8.3 10e3/uL    Absolute Lymphocytes 1.9 0.8 - 5.3 10e3/uL    Absolute Monocytes 0.5 0.0 - 1.3 10e3/uL    Absolute Eosinophils 0.6 0.0 - 0.7 10e3/uL    Absolute Basophils 0.1 0.0 - 0.2 10e3/uL    Absolute Immature Granulocytes 0.0 <=0.4 10e3/uL   ESR: Erythrocyte sedimentation rate     Status: Normal   Result Value Ref Range    Erythrocyte Sedimentation Rate 3 0 - 20 mm/hr   Wet prep - Clinic Collect     Status: Abnormal    Specimen: Vagina; Swab   Result Value Ref Range    Trichomonas Absent Absent    Yeast Absent Absent    Clue Cells Absent Absent    WBCs/high power field 1+ (A) None   CBC with platelets and differential     Status: None    Narrative    The following orders were created  for panel order CBC with platelets and differential.  Procedure                               Abnormality         Status                     ---------                               -----------         ------                     CBC with platelets and ...[[id number]]                      Final result                 Please view results for these tests on the individual orders.             Signed Electronically by: Aleks Suarez DO

## 2025-04-30 NOTE — PATIENT INSTRUCTIONS
Normal initial labs  Awaiting other tests  Follow up with rheum  I like dr stevenson  Follow up in 3 months   Aleks Suarez D.O.

## 2025-05-01 ENCOUNTER — PATIENT OUTREACH (OUTPATIENT)
Dept: CARE COORDINATION | Facility: CLINIC | Age: 25
End: 2025-05-01
Payer: COMMERCIAL

## 2025-05-01 VITALS
RESPIRATION RATE: 16 BRPM | OXYGEN SATURATION: 100 % | BODY MASS INDEX: 18.04 KG/M2 | WEIGHT: 126 LBS | HEART RATE: 72 BPM | SYSTOLIC BLOOD PRESSURE: 114 MMHG | TEMPERATURE: 98 F | HEIGHT: 70 IN | DIASTOLIC BLOOD PRESSURE: 70 MMHG

## 2025-05-01 LAB
ANA SER QL IF: NEGATIVE
C TRACH DNA SPEC QL PROBE+SIG AMP: NEGATIVE
CCP AB SER IA-ACNC: 1 U/ML
IGA SERPL-MCNC: 170 MG/DL (ref 84–499)
N GONORRHOEA DNA SPEC QL NAA+PROBE: NEGATIVE
SPECIMEN TYPE: NORMAL
TTG IGA SER-ACNC: 0.3 U/ML
TTG IGG SER-ACNC: <0.6 U/ML

## 2025-05-01 ASSESSMENT — PAIN SCALES - GENERAL: PAINLEVEL_OUTOF10: NO PAIN (0)

## 2025-05-01 NOTE — RESULT ENCOUNTER NOTE
All your results are essentially patricia. Please contact me if you have any questions.  Take care,  Aleks Suarez D.O.

## 2025-05-05 ENCOUNTER — PATIENT OUTREACH (OUTPATIENT)
Dept: CARE COORDINATION | Facility: CLINIC | Age: 25
End: 2025-05-05
Payer: COMMERCIAL

## 2025-05-10 ENCOUNTER — HEALTH MAINTENANCE LETTER (OUTPATIENT)
Age: 25
End: 2025-05-10

## 2025-08-04 ENCOUNTER — OFFICE VISIT (OUTPATIENT)
Dept: RHEUMATOLOGY | Facility: CLINIC | Age: 25
End: 2025-08-04
Payer: COMMERCIAL

## 2025-08-04 VITALS
DIASTOLIC BLOOD PRESSURE: 72 MMHG | OXYGEN SATURATION: 98 % | BODY MASS INDEX: 20.78 KG/M2 | SYSTOLIC BLOOD PRESSURE: 120 MMHG | HEART RATE: 73 BPM | WEIGHT: 144.8 LBS

## 2025-08-04 DIAGNOSIS — M25.551 PAIN OF BOTH HIP JOINTS: ICD-10-CM

## 2025-08-04 DIAGNOSIS — M25.561 ARTHRALGIA OF BOTH KNEES: ICD-10-CM

## 2025-08-04 DIAGNOSIS — M25.552 PAIN OF BOTH HIP JOINTS: ICD-10-CM

## 2025-08-04 DIAGNOSIS — M25.532 PAIN IN BOTH WRISTS: ICD-10-CM

## 2025-08-04 DIAGNOSIS — M25.562 ARTHRALGIA OF BOTH KNEES: ICD-10-CM

## 2025-08-04 DIAGNOSIS — M25.531 PAIN IN BOTH WRISTS: ICD-10-CM

## 2025-08-04 DIAGNOSIS — Z87.09 HISTORY OF FREQUENT UPPER RESPIRATORY INFECTION: ICD-10-CM

## 2025-08-04 PROCEDURE — 99204 OFFICE O/P NEW MOD 45 MIN: CPT | Performed by: INTERNAL MEDICINE

## 2025-08-04 PROCEDURE — 1125F AMNT PAIN NOTED PAIN PRSNT: CPT | Performed by: INTERNAL MEDICINE

## 2025-08-04 PROCEDURE — 3078F DIAST BP <80 MM HG: CPT | Performed by: INTERNAL MEDICINE

## 2025-08-04 PROCEDURE — 3074F SYST BP LT 130 MM HG: CPT | Performed by: INTERNAL MEDICINE

## 2025-08-04 ASSESSMENT — PAIN SCALES - GENERAL: PAINLEVEL_OUTOF10: SEVERE PAIN (7)
